# Patient Record
Sex: FEMALE | Race: WHITE | NOT HISPANIC OR LATINO | Employment: OTHER | URBAN - METROPOLITAN AREA
[De-identification: names, ages, dates, MRNs, and addresses within clinical notes are randomized per-mention and may not be internally consistent; named-entity substitution may affect disease eponyms.]

---

## 2019-01-10 ENCOUNTER — APPOINTMENT (OUTPATIENT)
Dept: WOMENS IMAGING | Facility: HOSPITAL | Age: 74
End: 2019-01-10

## 2019-01-10 PROCEDURE — 77067 SCR MAMMO BI INCL CAD: CPT | Performed by: RADIOLOGY

## 2020-06-23 ENCOUNTER — APPOINTMENT (OUTPATIENT)
Dept: WOMENS IMAGING | Facility: HOSPITAL | Age: 75
End: 2020-06-23

## 2020-06-23 PROCEDURE — 77067 SCR MAMMO BI INCL CAD: CPT | Performed by: RADIOLOGY

## 2020-06-26 ENCOUNTER — OFFICE VISIT (OUTPATIENT)
Dept: NEUROLOGY | Facility: CLINIC | Age: 75
End: 2020-06-26

## 2020-06-26 ENCOUNTER — LAB REQUISITION (OUTPATIENT)
Dept: LAB | Facility: HOSPITAL | Age: 75
End: 2020-06-26

## 2020-06-26 VITALS
HEART RATE: 72 BPM | SYSTOLIC BLOOD PRESSURE: 122 MMHG | DIASTOLIC BLOOD PRESSURE: 64 MMHG | WEIGHT: 144 LBS | BODY MASS INDEX: 24.59 KG/M2 | HEIGHT: 64 IN

## 2020-06-26 DIAGNOSIS — Z00.00 ROUTINE GENERAL MEDICAL EXAMINATION AT A HEALTH CARE FACILITY: ICD-10-CM

## 2020-06-26 DIAGNOSIS — G62.9 POLYNEUROPATHY: Primary | ICD-10-CM

## 2020-06-26 DIAGNOSIS — M79.7 FIBROMYALGIA: ICD-10-CM

## 2020-06-26 PROCEDURE — 99204 OFFICE O/P NEW MOD 45 MIN: CPT | Performed by: PSYCHIATRY & NEUROLOGY

## 2020-06-26 PROCEDURE — 36415 COLL VENOUS BLD VENIPUNCTURE: CPT

## 2020-06-26 RX ORDER — CHLORAL HYDRATE 500 MG
CAPSULE ORAL
COMMUNITY
End: 2023-03-24

## 2020-06-26 RX ORDER — ACETAMINOPHEN 500 MG
400 TABLET ORAL EVERY 6 HOURS PRN
COMMUNITY

## 2020-06-26 RX ORDER — OMEPRAZOLE 40 MG/1
40 CAPSULE, DELAYED RELEASE ORAL DAILY
COMMUNITY
End: 2023-01-07

## 2020-06-26 RX ORDER — GUAIFENESIN 600 MG/1
600 TABLET, EXTENDED RELEASE ORAL DAILY PRN
COMMUNITY
End: 2023-03-01

## 2020-06-26 NOTE — PROGRESS NOTES
Subjective:    CC: Millie De La Fuente is seen today in consultation at the request of Remi Morris MD for Polyneuropathy       HPI:  74-year-old female with a history of GERD, fibromyalgia, hyperlipidemia presents with diffuse pain in her hands and feet.  As per patient she started having burning pain in the bottoms of her feet as well as numbness about 20 years ago but over time her symptoms have progressed into her legs as well as her hands.  She states her arms are very sensitive to touch and wearing something tight can hurt a lot.  She also has diffuse pain in her joints including her shoulders and hips.  Was diagnosed with fibromyalgia several years ago but was never started on any medications as she was afraid of the side effects.  The diffuse pain is now affecting the quality of her life as she does not take part in several activities such as swimming anymore.  She denies having any history of diabetes, heavy alcohol intake or family history of neuropathy.  She grew up in [place] and took part in a lot of winter activities which she feels may have brought on the neuropathy.  Has had brain scans in the past that ruled out multiple sclerosis.    The following portions of the patient's history were reviewed today and updated as of 06/26/2020  : allergies, current medications, past family history, past medical history, past social history, past surgical history and problem list  These document will be scanned to patient's chart.      Current Outpatient Medications:   •  acetaminophen (TYLENOL) 500 MG tablet, Take 500 mg by mouth Every 6 (Six) Hours As Needed for Mild Pain ., Disp: , Rfl:   •  Cholecalciferol (VITAMIN D3) 125 MCG (5000 UT) capsule capsule, Take 5,000 Units by mouth Daily., Disp: , Rfl:   •  guaiFENesin (MUCINEX) 600 MG 12 hr tablet, Take 600 mg by mouth 2 (Two) Times a Day., Disp: , Rfl:   •  Multiple Vitamins-Minerals (HAIR SKIN & NAILS ADVANCED PO), Take  by mouth., Disp: , Rfl:   •  Omega-3 Fatty  "Acids (FISH OIL) 1000 MG capsule capsule, Take  by mouth Daily With Breakfast., Disp: , Rfl:   •  omeprazole (priLOSEC) 40 MG capsule, Take 40 mg by mouth Daily., Disp: , Rfl:   •  Probiotic Product (UP4 PROBIOTICS ADULT PO), Take  by mouth., Disp: , Rfl:    History reviewed. No pertinent past medical history.   History reviewed. No pertinent surgical history.   History reviewed. No pertinent family history.   Social History     Socioeconomic History   • Marital status: Unknown     Spouse name: Not on file   • Number of children: Not on file   • Years of education: Not on file   • Highest education level: Not on file   Tobacco Use   • Smoking status: Former Smoker   • Smokeless tobacco: Never Used   Substance and Sexual Activity   • Alcohol use: Defer   • Drug use: Defer   • Sexual activity: Defer     Review of Systems   Musculoskeletal: Positive for arthralgias.   Neurological: Positive for numbness.   All other systems reviewed and are negative.      Objective:    /64   Pulse 72   Ht 162.6 cm (64\")   Wt 65.3 kg (144 lb)   BMI 24.72 kg/m²     Neurology Exam:    General apperance: NAD.     Mental status: Alert, awake and oriented to time place and person.    Recent and Remote memory: Intact.    Attention span and Concentration: Normal.     Language and Speech: Intact- No dysarthria.    Fluency, Naming , Repitition and Comprehension:  Intact    Cranial Nerves:   CN II: Visual fields are full. Intact. Fundi - Normal, No papillederma, Pupils - PAZ  CN III, IV and VI: Extraocular movements are intact. Normal saccades.   CN V: Facial sensation is intact.   CN VII: Muscles of facial expression reveal no asymmetry. Intact.   CN VIII: Hearing is intact. Whispered voice intact.   CN IX and X: Palate elevates symmetrically. Intact  CN XI: Shoulder shrug is intact.   CN XII: Tongue is midline without evidence of atrophy or fasciculation.     Ophthalmoscopic exam of optic disc-normal    Motor:  Right UE muscle " strength 5/5. Normal tone.     Left UE muscle strength 5/5. Normal tone.      Right LE muscle strength5/5. Normal tone.     Left LE muscle strength 5/5. Normal tone.      Sensory: Mildly reduced to pinprick in both feet to level of ankles as well as slightly reduced vibration.  She had hyperesthesia to light touch in upper extremities    DTRs: 2+ bilaterally in upper and lower extremities.    Babinski: Negative bilaterally.    Co-ordination: Normal finger-to-nose, heel to shin B/L.    Rhomberg: Negative.    Gait: Normal.    Cardiovascular: Regular rate and rhythm without murmur, gallop or rub.    Assessment and Plan:  1. Polyneuropathy  Patient could have a combination of idiopathic peripheral neuropathy in addition to fibromyalgia  I will get an EMG/NCS as well as obtain a neuropathy panel  I may start her on Cymbalta at her next visit that may help with the neuropathic pain and her fibromyalgia    - ALAN by IFA, Reflex 9-biomarkers profile; Future  - Hemoglobin A1c; Future  - Protein Elec + Interp, Serum; Future  - TSH+Free T4; Future  - Vitamin B12 & Folate; Future  - Vitamin B6; Future  - EMG & Nerve Conduction Test; Future    2. Fibromyalgia  May start her on Cymbalta at her next visit       Return in about 2 months (around 8/26/2020).         Madonna Irene MD

## 2020-06-29 ENCOUNTER — TELEPHONE (OUTPATIENT)
Dept: NEUROLOGY | Facility: CLINIC | Age: 75
End: 2020-06-29

## 2020-07-01 LAB
ALBUMIN SERPL ELPH-MCNC: 3.8 G/DL (ref 2.9–4.4)
ALBUMIN/GLOB SERPL: 1.2 {RATIO} (ref 0.7–1.7)
ALPHA1 GLOB SERPL ELPH-MCNC: 0.3 G/DL (ref 0–0.4)
ALPHA2 GLOB SERPL ELPH-MCNC: 0.8 G/DL (ref 0.4–1)
ANA TITR SER IF: NEGATIVE {TITER}
B-GLOBULIN SERPL ELPH-MCNC: 1.2 G/DL (ref 0.7–1.3)
FOLATE SERPL-MCNC: 18.5 NG/ML
GAMMA GLOB SERPL ELPH-MCNC: 0.9 G/DL (ref 0.4–1.8)
GLOBULIN SER CALC-MCNC: 3.2 G/DL (ref 2.2–3.9)
HBA1C MFR BLD: 5.7 % (ref 4.8–5.6)
LABORATORY COMMENT REPORT: NORMAL
LABORATORY COMMENT REPORT: NORMAL
M PROTEIN SERPL ELPH-MCNC: NORMAL G/DL
PROT PATTERN SERPL ELPH-IMP: NORMAL
PROT SERPL-MCNC: 7 G/DL (ref 6–8.5)
T4 FREE SERPL-MCNC: 1.18 NG/DL (ref 0.82–1.77)
TSH SERPL DL<=0.005 MIU/L-ACNC: 1.87 UIU/ML (ref 0.45–4.5)
VIT B12 SERPL-MCNC: 504 PG/ML (ref 232–1245)
VIT B6 SERPL-MCNC: 25.7 UG/L (ref 2–32.8)

## 2020-07-01 NOTE — TELEPHONE ENCOUNTER
Called and spoke to pt informing of lab results and informed per Dr. Irene's recommendation concerning modifying diet. Pt stated verbal understanding. Thanks.

## 2020-08-06 ENCOUNTER — HOSPITAL ENCOUNTER (OUTPATIENT)
Dept: NEUROLOGY | Facility: HOSPITAL | Age: 75
Discharge: HOME | End: 2020-08-06
Admitting: PSYCHIATRY & NEUROLOGY

## 2020-08-06 DIAGNOSIS — G62.9 POLYNEUROPATHY: ICD-10-CM

## 2020-08-06 PROCEDURE — 95913 NRV CNDJ TEST 13/> STUDIES: CPT

## 2020-08-06 PROCEDURE — 95886 MUSC TEST DONE W/N TEST COMP: CPT

## 2020-08-06 PROCEDURE — 95912 NRV CNDJ TEST 11-12 STUDIES: CPT

## 2020-08-13 ENCOUNTER — TELEPHONE (OUTPATIENT)
Dept: NEUROLOGY | Facility: CLINIC | Age: 75
End: 2020-08-13

## 2020-08-13 NOTE — TELEPHONE ENCOUNTER
Left detailed vm for Millie relaying results/recomendation per  and requested a return call to clinic regarding her response. Office # given. Thanks!

## 2020-09-17 ENCOUNTER — OFFICE VISIT (OUTPATIENT)
Dept: NEUROLOGY | Facility: CLINIC | Age: 75
End: 2020-09-17

## 2020-09-17 VITALS
DIASTOLIC BLOOD PRESSURE: 64 MMHG | HEIGHT: 64 IN | HEART RATE: 77 BPM | SYSTOLIC BLOOD PRESSURE: 104 MMHG | OXYGEN SATURATION: 99 % | WEIGHT: 140.2 LBS | TEMPERATURE: 96.9 F | BODY MASS INDEX: 23.93 KG/M2

## 2020-09-17 DIAGNOSIS — G62.9 POLYNEUROPATHY: Primary | ICD-10-CM

## 2020-09-17 PROCEDURE — 99214 OFFICE O/P EST MOD 30 MIN: CPT | Performed by: PSYCHIATRY & NEUROLOGY

## 2020-09-17 RX ORDER — ATORVASTATIN CALCIUM 40 MG/1
40 TABLET, FILM COATED ORAL DAILY
COMMUNITY
Start: 2020-06-30 | End: 2022-12-16

## 2020-09-17 RX ORDER — GABAPENTIN 100 MG/1
300 CAPSULE ORAL NIGHTLY
Qty: 90 CAPSULE | Refills: 3 | Status: SHIPPED | OUTPATIENT
Start: 2020-09-17 | End: 2020-11-18 | Stop reason: SDUPTHER

## 2020-09-17 NOTE — PROGRESS NOTES
Subjective:    CC: Millie De La Fuente is seen today  for Peripheral Neuropathy       HPI:  Current visit-since her last visit patient had her EMG/NCS that showed mild peripheral neuropathy.  She also had blood work which was normal other than slightly high A1c of 5.7.  Today she tells me that she tried low doses of Cymbalta for years ago for fibromyalgia but had side effects of dizziness however was coming off of her antibiotics for an infection at the time so does not know if the side effects were caused by the antibiotics or the Cymbalta.  She continues to have burning in her feet as well as generalized myalgias.    Initial visit- 74-year-old female with a history of GERD, fibromyalgia, hyperlipidemia presents with diffuse pain in her hands and feet.  As per patient she started having burning pain in the bottoms of her feet as well as numbness about 20 years ago but over time her symptoms have progressed into her legs as well as her hands.  She states her arms are very sensitive to touch and wearing something tight can hurt a lot.  She also has diffuse pain in her joints including her shoulders and hips.  Was diagnosed with fibromyalgia several years ago but was never started on any medications as she was afraid of the side effects.  The diffuse pain is now affecting the quality of her life as she does not take part in several activities such as swimming anymore.  She denies having any history of diabetes, heavy alcohol intake or family history of neuropathy.  She grew up in [place] and took part in a lot of winter activities which she feels may have brought on the neuropathy.  Has had brain scans in the past that ruled out multiple sclerosis.    The following portions of the patient's history were reviewed today and updated as of 06/26/2020  : allergies, current medications, past family history, past medical history, past social history, past surgical history and problem list  These document will be scanned to patient's  "chart.      Current Outpatient Medications:   •  acetaminophen (TYLENOL) 500 MG tablet, Take 500 mg by mouth Every 6 (Six) Hours As Needed for Mild Pain ., Disp: , Rfl:   •  atorvastatin (LIPITOR) 40 MG tablet, Take 40 mg by mouth Daily., Disp: , Rfl:   •  Cholecalciferol (VITAMIN D3) 125 MCG (5000 UT) capsule capsule, Take 5,000 Units by mouth Daily., Disp: , Rfl:   •  guaiFENesin (MUCINEX) 600 MG 12 hr tablet, Take 600 mg by mouth Daily As Needed., Disp: , Rfl:   •  Multiple Vitamins-Minerals (HAIR SKIN & NAILS ADVANCED PO), Take  by mouth., Disp: , Rfl:   •  Omega-3 Fatty Acids (FISH OIL) 1000 MG capsule capsule, Take  by mouth Daily With Breakfast., Disp: , Rfl:   •  omeprazole (priLOSEC) 40 MG capsule, Take 40 mg by mouth Daily., Disp: , Rfl:   •  Probiotic Product (UP4 PROBIOTICS ADULT PO), Take  by mouth., Disp: , Rfl:   •  gabapentin (Neurontin) 100 MG capsule, Take 3 capsules by mouth Every Night., Disp: 90 capsule, Rfl: 3   No past medical history on file.   No past surgical history on file.   No family history on file.   Social History     Socioeconomic History   • Marital status: Unknown     Spouse name: Not on file   • Number of children: Not on file   • Years of education: Not on file   • Highest education level: Not on file   Tobacco Use   • Smoking status: Former Smoker   • Smokeless tobacco: Never Used   Substance and Sexual Activity   • Alcohol use: Defer   • Drug use: Defer   • Sexual activity: Defer     Review of Systems   Musculoskeletal: Positive for arthralgias.   Neurological: Positive for numbness.   All other systems reviewed and are negative.      Objective:    /64   Pulse 77   Temp 96.9 °F (36.1 °C) (Temporal)   Ht 162.6 cm (64.02\")   Wt 63.6 kg (140 lb 3.2 oz)   SpO2 99%   BMI 24.05 kg/m²     Neurology Exam:    General apperance: NAD.     Mental status: Alert, awake and oriented to time place and person.    Recent and Remote memory: Intact.    Attention span and " Concentration: Normal.     Language and Speech: Intact- No dysarthria.    Fluency, Naming , Repitition and Comprehension:  Intact    Cranial Nerves:   CN II: Visual fields are full. Intact. Fundi - Normal, No papillederma, Pupils - PAZ  CN III, IV and VI: Extraocular movements are intact. Normal saccades.   CN V: Facial sensation is intact.   CN VII: Muscles of facial expression reveal no asymmetry. Intact.   CN VIII: Hearing is intact. Whispered voice intact.   CN IX and X: Palate elevates symmetrically. Intact  CN XI: Shoulder shrug is intact.   CN XII: Tongue is midline without evidence of atrophy or fasciculation.     Ophthalmoscopic exam of optic disc-normal    Motor:  Right UE muscle strength 5/5. Normal tone.     Left UE muscle strength 5/5. Normal tone.      Right LE muscle strength5/5. Normal tone.     Left LE muscle strength 5/5. Normal tone.      Sensory: Mildly reduced to pinprick in both feet to level of ankles as well as slightly reduced vibration.  She had hyperesthesia to light touch in upper extremities    DTRs: 2+ bilaterally in upper and lower extremities.    Babinski: Negative bilaterally.    Co-ordination: Normal finger-to-nose, heel to shin B/L.    Rhomberg: Negative.    Gait: Normal.    Cardiovascular: Regular rate and rhythm without murmur, gallop or rub.    Assessment and Plan:  1. Polyneuropathy  She most likely has idiopathic neuropathy.  For her prediabetes I have asked her to carry on dietary modifications  I will start her on low doses of gabapentin 100 mg gradually increasing to 3 capsules at night.  In the future I may further increase her dose    2. Fibromyalgia  Hopefully the gabapentin will help       Return in about 2 months (around 11/17/2020).         Madonna Irene MD

## 2020-11-18 ENCOUNTER — OFFICE VISIT (OUTPATIENT)
Dept: NEUROLOGY | Facility: CLINIC | Age: 75
End: 2020-11-18

## 2020-11-18 VITALS
OXYGEN SATURATION: 98 % | TEMPERATURE: 97.3 F | HEART RATE: 72 BPM | DIASTOLIC BLOOD PRESSURE: 80 MMHG | SYSTOLIC BLOOD PRESSURE: 98 MMHG

## 2020-11-18 DIAGNOSIS — G62.9 POLYNEUROPATHY: Primary | ICD-10-CM

## 2020-11-18 PROCEDURE — 99213 OFFICE O/P EST LOW 20 MIN: CPT | Performed by: PSYCHIATRY & NEUROLOGY

## 2020-11-18 RX ORDER — GABAPENTIN 100 MG/1
CAPSULE ORAL
Qty: 90 CAPSULE | Refills: 3 | Status: SHIPPED | OUTPATIENT
Start: 2020-11-18 | End: 2021-03-11 | Stop reason: SDUPTHER

## 2020-11-18 NOTE — PROGRESS NOTES
Subjective:    CC: Millie De La Fuente is seen today  for Peripheral Neuropathy       HPI:  Current visit-patient states that she feels a lot better after starting gabapentin now at 300 mg nightly as her feet have stopped hurting.  However she has felt slightly off balance and lightheaded on standing up and walking occasionally.  In fact she sustained a fall last week hurting her left foot.  Has not checked her blood pressure to make sure that it is running within normal limits as her blood pressure machine is broken.    Last visit-since her last visit patient had her EMG/NCS that showed mild peripheral neuropathy.  She also had blood work which was normal other than slightly high A1c of 5.7.  Today she tells me that she tried low doses of Cymbalta for years ago for fibromyalgia but had side effects of dizziness however was coming off of her antibiotics for an infection at the time so does not know if the side effects were caused by the antibiotics or the Cymbalta.  She continues to have burning in her feet as well as generalized myalgias.    Initial visit- 74-year-old female with a history of GERD, fibromyalgia, hyperlipidemia presents with diffuse pain in her hands and feet.  As per patient she started having burning pain in the bottoms of her feet as well as numbness about 20 years ago but over time her symptoms have progressed into her legs as well as her hands.  She states her arms are very sensitive to touch and wearing something tight can hurt a lot.  She also has diffuse pain in her joints including her shoulders and hips.  Was diagnosed with fibromyalgia several years ago but was never started on any medications as she was afraid of the side effects.  The diffuse pain is now affecting the quality of her life as she does not take part in several activities such as swimming anymore.  She denies having any history of diabetes, heavy alcohol intake or family history of neuropathy.  She grew up in [place] and took part  in a lot of winter activities which she feels may have brought on the neuropathy.  Has had brain scans in the past that ruled out multiple sclerosis.    The following portions of the patient's history were reviewed today and updated as of 06/26/2020  : allergies, current medications, past family history, past medical history, past social history, past surgical history and problem list  These document will be scanned to patient's chart.      Current Outpatient Medications:   •  acetaminophen (TYLENOL) 500 MG tablet, Take 400 mg by mouth Every 6 (Six) Hours As Needed for Mild Pain ., Disp: , Rfl:   •  atorvastatin (LIPITOR) 40 MG tablet, Take 40 mg by mouth Daily., Disp: , Rfl:   •  Cholecalciferol (VITAMIN D3) 125 MCG (5000 UT) capsule capsule, Take 5,000 Units by mouth Daily., Disp: , Rfl:   •  gabapentin (Neurontin) 100 MG capsule, Take 2 to 3 tablets as needed at night, Disp: 90 capsule, Rfl: 3  •  Multiple Vitamins-Minerals (HAIR SKIN & NAILS ADVANCED PO), Take  by mouth., Disp: , Rfl:   •  Omega-3 Fatty Acids (FISH OIL) 1000 MG capsule capsule, Take  by mouth Daily With Breakfast., Disp: , Rfl:   •  omeprazole (priLOSEC) 40 MG capsule, Take 40 mg by mouth Daily., Disp: , Rfl:   •  Probiotic Product (UP4 PROBIOTICS ADULT PO), Take  by mouth., Disp: , Rfl:   •  guaiFENesin (MUCINEX) 600 MG 12 hr tablet, Take 600 mg by mouth Daily As Needed., Disp: , Rfl:    History reviewed. No pertinent past medical history.   History reviewed. No pertinent surgical history.   History reviewed. No pertinent family history.   Social History     Socioeconomic History   • Marital status: Unknown     Spouse name: Not on file   • Number of children: Not on file   • Years of education: Not on file   • Highest education level: Not on file   Tobacco Use   • Smoking status: Former Smoker   • Smokeless tobacco: Never Used   Substance and Sexual Activity   • Alcohol use: Defer   • Drug use: Defer   • Sexual activity: Defer     Review of  Systems   Musculoskeletal: Positive for arthralgias.   Neurological: Positive for numbness.   All other systems reviewed and are negative.      Objective:    BP 98/80   Pulse 72   Temp 97.3 °F (36.3 °C)   SpO2 98%     Neurology Exam:    General apperance: NAD.     Mental status: Alert, awake and oriented to time place and person.    Recent and Remote memory: Intact.    Attention span and Concentration: Normal.     Language and Speech: Intact- No dysarthria.    Fluency, Naming , Repitition and Comprehension:  Intact    Cranial Nerves:   CN II: Visual fields are full. Intact. Fundi - Normal, No papillederma, Pupils - PAZ  CN III, IV and VI: Extraocular movements are intact. Normal saccades.   CN V: Facial sensation is intact.   CN VII: Muscles of facial expression reveal no asymmetry. Intact.   CN VIII: Hearing is intact. Whispered voice intact.   CN IX and X: Palate elevates symmetrically. Intact  CN XI: Shoulder shrug is intact.   CN XII: Tongue is midline without evidence of atrophy or fasciculation.     Ophthalmoscopic exam of optic disc-normal    Motor:  Right UE muscle strength 5/5. Normal tone.     Left UE muscle strength 5/5. Normal tone.      Right LE muscle strength5/5. Normal tone.     Left LE muscle strength 5/5. Normal tone.      Sensory: Mildly reduced to pinprick in both feet to level of ankles as well as slightly reduced vibration.  She had hyperesthesia to light touch in upper extremities    DTRs: 2+ bilaterally in upper and lower extremities.    Babinski: Negative bilaterally.    Co-ordination: Normal finger-to-nose, heel to shin B/L.    Rhomberg: Negative.    Gait: Normal.    Cardiovascular: Regular rate and rhythm without murmur, gallop or rub.    Assessment and Plan:  1. Polyneuropathy  She most likely has idiopathic neuropathy.  For her prediabetes I had asked her to carry on dietary modifications  I have told her to reduce her gabapentin to 200 mg nightly.  She can take an additional dose  of 100 mg if needed  I do not think her balance problems are because of gabapentin.  I feel it is most likely due to her low blood pressure and possibly drops in blood pressure on standing up.  I have told her to monitor her blood pressure regularly, keep herself well-hydrated and wear moderate compression stockings if her lightheadedness does not resolve    2. Fibromyalgia  Continue gabapentin       Return in about 5 months (around 4/18/2021).         Madonna Irene MD

## 2021-02-22 ENCOUNTER — APPOINTMENT (OUTPATIENT)
Dept: CT IMAGING | Facility: HOSPITAL | Age: 76
End: 2021-02-22

## 2021-02-22 ENCOUNTER — HOSPITAL ENCOUNTER (EMERGENCY)
Facility: HOSPITAL | Age: 76
Discharge: HOME | End: 2021-02-22
Attending: EMERGENCY MEDICINE | Admitting: EMERGENCY MEDICINE

## 2021-02-22 VITALS
BODY MASS INDEX: 23.9 KG/M2 | RESPIRATION RATE: 18 BRPM | WEIGHT: 140 LBS | SYSTOLIC BLOOD PRESSURE: 112 MMHG | HEIGHT: 64 IN | DIASTOLIC BLOOD PRESSURE: 53 MMHG | OXYGEN SATURATION: 96 % | HEART RATE: 68 BPM | TEMPERATURE: 98 F

## 2021-02-22 DIAGNOSIS — E87.1 HYPONATREMIA: ICD-10-CM

## 2021-02-22 DIAGNOSIS — U07.1 COVID-19 VIRUS INFECTION: Primary | ICD-10-CM

## 2021-02-22 LAB
ALBUMIN SERPL-MCNC: 3.8 G/DL (ref 3.5–5.2)
ALBUMIN/GLOB SERPL: 1.4 G/DL
ALP SERPL-CCNC: 81 U/L (ref 39–117)
ALT SERPL W P-5'-P-CCNC: 14 U/L (ref 1–33)
ANION GAP SERPL CALCULATED.3IONS-SCNC: 6 MMOL/L (ref 5–15)
AST SERPL-CCNC: 21 U/L (ref 1–32)
B PARAPERT DNA SPEC QL NAA+PROBE: NOT DETECTED
B PERT DNA SPEC QL NAA+PROBE: NOT DETECTED
BASOPHILS # BLD AUTO: 0.01 10*3/MM3 (ref 0–0.2)
BASOPHILS NFR BLD AUTO: 0.2 % (ref 0–1.5)
BILIRUB SERPL-MCNC: 0.3 MG/DL (ref 0–1.2)
BILIRUB UR QL STRIP: NEGATIVE
BUN SERPL-MCNC: 10 MG/DL (ref 8–23)
BUN/CREAT SERPL: 15.9 (ref 7–25)
C PNEUM DNA NPH QL NAA+NON-PROBE: NOT DETECTED
CALCIUM SPEC-SCNC: 8.6 MG/DL (ref 8.6–10.5)
CHLORIDE SERPL-SCNC: 93 MMOL/L (ref 98–107)
CLARITY UR: CLEAR
CO2 SERPL-SCNC: 27 MMOL/L (ref 22–29)
COLOR UR: YELLOW
CREAT SERPL-MCNC: 0.63 MG/DL (ref 0.57–1)
D-LACTATE SERPL-SCNC: 0.7 MMOL/L (ref 0.5–2)
DEPRECATED RDW RBC AUTO: 41.3 FL (ref 37–54)
EOSINOPHIL # BLD AUTO: 0.03 10*3/MM3 (ref 0–0.4)
EOSINOPHIL NFR BLD AUTO: 0.6 % (ref 0.3–6.2)
ERYTHROCYTE [DISTWIDTH] IN BLOOD BY AUTOMATED COUNT: 12.3 % (ref 12.3–15.4)
FLUAV SUBTYP SPEC NAA+PROBE: NOT DETECTED
FLUBV RNA ISLT QL NAA+PROBE: NOT DETECTED
GFR SERPL CREATININE-BSD FRML MDRD: 92 ML/MIN/1.73
GLOBULIN UR ELPH-MCNC: 2.7 GM/DL
GLUCOSE SERPL-MCNC: 104 MG/DL (ref 65–99)
GLUCOSE UR STRIP-MCNC: NEGATIVE MG/DL
HADV DNA SPEC NAA+PROBE: NOT DETECTED
HCOV 229E RNA SPEC QL NAA+PROBE: NOT DETECTED
HCOV HKU1 RNA SPEC QL NAA+PROBE: NOT DETECTED
HCOV NL63 RNA SPEC QL NAA+PROBE: NOT DETECTED
HCOV OC43 RNA SPEC QL NAA+PROBE: NOT DETECTED
HCT VFR BLD AUTO: 34.6 % (ref 34–46.6)
HGB BLD-MCNC: 11.7 G/DL (ref 12–15.9)
HGB UR QL STRIP.AUTO: NEGATIVE
HMPV RNA NPH QL NAA+NON-PROBE: NOT DETECTED
HOLD SPECIMEN: NORMAL
HPIV1 RNA SPEC QL NAA+PROBE: NOT DETECTED
HPIV2 RNA SPEC QL NAA+PROBE: NOT DETECTED
HPIV3 RNA NPH QL NAA+PROBE: NOT DETECTED
HPIV4 P GENE NPH QL NAA+PROBE: NOT DETECTED
IMM GRANULOCYTES # BLD AUTO: 0.03 10*3/MM3 (ref 0–0.05)
IMM GRANULOCYTES NFR BLD AUTO: 0.6 % (ref 0–0.5)
KETONES UR QL STRIP: NEGATIVE
LEUKOCYTE ESTERASE UR QL STRIP.AUTO: NEGATIVE
LIPASE SERPL-CCNC: 31 U/L (ref 13–60)
LYMPHOCYTES # BLD AUTO: 0.87 10*3/MM3 (ref 0.7–3.1)
LYMPHOCYTES NFR BLD AUTO: 16.3 % (ref 19.6–45.3)
M PNEUMO IGG SER IA-ACNC: NOT DETECTED
MCH RBC QN AUTO: 30.9 PG (ref 26.6–33)
MCHC RBC AUTO-ENTMCNC: 33.8 G/DL (ref 31.5–35.7)
MCV RBC AUTO: 91.3 FL (ref 79–97)
MONOCYTES # BLD AUTO: 0.72 10*3/MM3 (ref 0.1–0.9)
MONOCYTES NFR BLD AUTO: 13.5 % (ref 5–12)
NEUTROPHILS NFR BLD AUTO: 3.69 10*3/MM3 (ref 1.7–7)
NEUTROPHILS NFR BLD AUTO: 68.8 % (ref 42.7–76)
NITRITE UR QL STRIP: NEGATIVE
NRBC BLD AUTO-RTO: 0 /100 WBC (ref 0–0.2)
PH UR STRIP.AUTO: 6.5 [PH] (ref 5–8)
PLATELET # BLD AUTO: 207 10*3/MM3 (ref 140–450)
PMV BLD AUTO: 9.8 FL (ref 6–12)
POTASSIUM SERPL-SCNC: 4.7 MMOL/L (ref 3.5–5.2)
PROT SERPL-MCNC: 6.5 G/DL (ref 6–8.5)
PROT UR QL STRIP: NEGATIVE
RBC # BLD AUTO: 3.79 10*6/MM3 (ref 3.77–5.28)
RHINOVIRUS RNA SPEC NAA+PROBE: NOT DETECTED
RSV RNA NPH QL NAA+NON-PROBE: NOT DETECTED
SARS-COV-2 RNA NPH QL NAA+NON-PROBE: DETECTED
SODIUM SERPL-SCNC: 126 MMOL/L (ref 136–145)
SP GR UR STRIP: 1.02 (ref 1–1.03)
UROBILINOGEN UR QL STRIP: NORMAL
WBC # BLD AUTO: 5.35 10*3/MM3 (ref 3.4–10.8)
WHOLE BLOOD HOLD SPECIMEN: NORMAL
WHOLE BLOOD HOLD SPECIMEN: NORMAL

## 2021-02-22 PROCEDURE — 96360 HYDRATION IV INFUSION INIT: CPT

## 2021-02-22 PROCEDURE — 0202U NFCT DS 22 TRGT SARS-COV-2: CPT | Performed by: PHYSICIAN ASSISTANT

## 2021-02-22 PROCEDURE — 0 IOPAMIDOL PER 1 ML: Performed by: EMERGENCY MEDICINE

## 2021-02-22 PROCEDURE — 83690 ASSAY OF LIPASE: CPT | Performed by: EMERGENCY MEDICINE

## 2021-02-22 PROCEDURE — 85025 COMPLETE CBC W/AUTO DIFF WBC: CPT

## 2021-02-22 PROCEDURE — 71275 CT ANGIOGRAPHY CHEST: CPT

## 2021-02-22 PROCEDURE — 81003 URINALYSIS AUTO W/O SCOPE: CPT | Performed by: EMERGENCY MEDICINE

## 2021-02-22 PROCEDURE — 80053 COMPREHEN METABOLIC PANEL: CPT | Performed by: EMERGENCY MEDICINE

## 2021-02-22 PROCEDURE — 99284 EMERGENCY DEPT VISIT MOD MDM: CPT

## 2021-02-22 PROCEDURE — 83605 ASSAY OF LACTIC ACID: CPT | Performed by: EMERGENCY MEDICINE

## 2021-02-22 PROCEDURE — 63710000001 DEXAMETHASONE PER 0.25 MG: Performed by: PHYSICIAN ASSISTANT

## 2021-02-22 RX ORDER — AZITHROMYCIN 250 MG/1
TABLET, FILM COATED ORAL
Qty: 6 TABLET | Refills: 0 | Status: SHIPPED | OUTPATIENT
Start: 2021-02-22 | End: 2021-09-27

## 2021-02-22 RX ORDER — SODIUM CHLORIDE 9 MG/ML
10 INJECTION INTRAVENOUS AS NEEDED
Status: DISCONTINUED | OUTPATIENT
Start: 2021-02-22 | End: 2021-02-22 | Stop reason: HOSPADM

## 2021-02-22 RX ORDER — ONDANSETRON 4 MG/1
4 TABLET, FILM COATED ORAL EVERY 8 HOURS PRN
Qty: 20 TABLET | Refills: 0 | Status: SHIPPED | OUTPATIENT
Start: 2021-02-22 | End: 2022-03-24

## 2021-02-22 RX ADMIN — DEXAMETHASONE 6 MG: 4 TABLET ORAL at 14:57

## 2021-02-22 RX ADMIN — IOPAMIDOL 80 ML: 755 INJECTION, SOLUTION INTRAVENOUS at 13:58

## 2021-02-22 RX ADMIN — SODIUM CHLORIDE 1000 ML: 9 INJECTION, SOLUTION INTRAVENOUS at 13:05

## 2021-02-22 NOTE — DISCHARGE INSTRUCTIONS
Monitor your oxygen levels at home with the pulse oximeter given to you today.  If your oxygen levels drop below 94% on room air at rest, return to the emergency department.  Recheck with your primary care provider tomorrow by telephone.  Return to the emergency department immediately if any change or worsening of symptoms

## 2021-02-22 NOTE — ED PROVIDER NOTES
EMERGENCY DEPARTMENT ENCOUNTER    Pt Name: Millie De La Fuente  MRN: [id number]  Pt :   1945  Room Number:  1515  Date of encounter:  2021  PCP: Remi Morris MD  ED Provider: Richard Cherry PA-C    Historian: Patient      HPI:  Chief Complaint: Covid exposure, worsening illness        Context: Millie De La Fuente is a 75 y.o. female who presents to the ED c/o onset of Covid-like symptoms on February 10.  Her  tested positive on 2/10, patient tested negative, however she was not feeling well with some mild symptoms initially with nausea decreased appetite and some body aches.  Yesterday her symptoms worsened, with chills sweats dry cough slight shortness of breath dyspnea on exertion profound muscle fatigue, throbbing headache, diffuse myalgias and arthralgias with decreased appetite.  She did have one episode of diarrhea this morning, and has had decreased p.o. intake.  Urine output has been slightly decreased.  No stiff neck vision changes or photophobia.  No vision changes or photophobia.  Past medical history otherwise remarkable for fibromyalgia.  Primary care provider is Dr. Morris.  Medications reviewed, she is allergic to clindamycin lincomycin sulfa and penicillins.      PAST MEDICAL HISTORY  Active Ambulatory Problems     Diagnosis Date Noted   • Fibromyalgia 2020     Resolved Ambulatory Problems     Diagnosis Date Noted   • No Resolved Ambulatory Problems     Past Medical History:   Diagnosis Date   • GERD (gastroesophageal reflux disease)    • Hyperlipidemia          PAST SURGICAL HISTORY  Past Surgical History:   Procedure Laterality Date   • HYSTERECTOMY     • SHOULDER SURGERY     • TONSILLECTOMY           FAMILY HISTORY  History reviewed. No pertinent family history.      SOCIAL HISTORY  Social History     Socioeconomic History   • Marital status:      Spouse name: Not on file   • Number of children: Not on file   • Years of education: Not on file   • Highest education level: Not  on file   Tobacco Use   • Smoking status: Former Smoker     Quit date:      Years since quittin.1   • Smokeless tobacco: Never Used   Substance and Sexual Activity   • Alcohol use: Never     Frequency: Never   • Drug use: Never   • Sexual activity: Defer         ALLERGIES  Clindamycin/lincomycin, Sulfa antibiotics, and Penicillins        REVIEW OF SYSTEMS  Review of Systems   Constitutional: Positive for activity change, appetite change, chills, diaphoresis, fatigue and fever.   HENT: Negative for congestion, rhinorrhea and sore throat.    Eyes: Negative for photophobia and visual disturbance.   Respiratory: Positive for cough and shortness of breath. Negative for wheezing.    Cardiovascular: Negative for chest pain, palpitations and leg swelling.   Gastrointestinal: Positive for diarrhea and nausea. Negative for abdominal pain and vomiting.   Genitourinary: Negative for dysuria, flank pain and hematuria.   Musculoskeletal: Positive for arthralgias, back pain, myalgias and neck pain.   Neurological: Positive for dizziness and headaches. Negative for syncope.   All other systems reviewed and are negative.       All systems reviewed and negative except for those discussed in HPI.       PHYSICAL EXAM    I have reviewed the triage vital signs and nursing notes.    ED Triage Vitals [21 1153]   Temp Heart Rate Resp BP SpO2   98 °F (36.7 °C) 66 18 145/71 98 %      Temp src Heart Rate Source Patient Position BP Location FiO2 (%)   Oral Monitor Lying -- --       Physical Exam  GENERAL:   Pleasant awake alert and oriented not toxic appearing afebrile hemodynamically stable  HENT: Nares patent.  Mucous membranes are dry no facial asymmetry airway is patent uvula is midline and submental space is soft.  EYES: No scleral icterus.  CV: Regular rhythm, regular rate.  RESPIRATORY: Normal effort.  No audible wheezes, rales or rhonchi.  ABDOMEN: Soft, nontender.  No guarding or rebound tenderness.  Bowel sounds are  normal.  No palpable organomegaly or pulsatile masses.  MUSCULOSKELETAL: No deformities.   NEURO: Alert, moves all extremities, follows commands.  SKIN: Warm, dry, no rash visualized.        LAB RESULTS  Recent Results (from the past 24 hour(s))   Lactic Acid, Plasma    Collection Time: 02/22/21 12:00 PM    Specimen: Blood   Result Value Ref Range    Lactate 0.7 0.5 - 2.0 mmol/L   Light Blue Top    Collection Time: 02/22/21 12:00 PM   Result Value Ref Range    Extra Tube hold for add-on    Lavender Top    Collection Time: 02/22/21 12:00 PM   Result Value Ref Range    Extra Tube     Gold Top - SST    Collection Time: 02/22/21 12:00 PM   Result Value Ref Range    Extra Tube Hold for add-ons.    Gray Top - Ice    Collection Time: 02/22/21 12:00 PM   Result Value Ref Range    Extra Tube Hold for add-ons.    CBC Auto Differential    Collection Time: 02/22/21 12:00 PM    Specimen: Blood   Result Value Ref Range    WBC 5.35 3.40 - 10.80 10*3/mm3    RBC 3.79 3.77 - 5.28 10*6/mm3    Hemoglobin 11.7 (L) 12.0 - 15.9 g/dL    Hematocrit 34.6 34.0 - 46.6 %    MCV 91.3 79.0 - 97.0 fL    MCH 30.9 26.6 - 33.0 pg    MCHC 33.8 31.5 - 35.7 g/dL    RDW 12.3 12.3 - 15.4 %    RDW-SD 41.3 37.0 - 54.0 fl    MPV 9.8 6.0 - 12.0 fL    Platelets 207 140 - 450 10*3/mm3    Neutrophil % 68.8 42.7 - 76.0 %    Lymphocyte % 16.3 (L) 19.6 - 45.3 %    Monocyte % 13.5 (H) 5.0 - 12.0 %    Eosinophil % 0.6 0.3 - 6.2 %    Basophil % 0.2 0.0 - 1.5 %    Immature Grans % 0.6 (H) 0.0 - 0.5 %    Neutrophils, Absolute 3.69 1.70 - 7.00 10*3/mm3    Lymphocytes, Absolute 0.87 0.70 - 3.10 10*3/mm3    Monocytes, Absolute 0.72 0.10 - 0.90 10*3/mm3    Eosinophils, Absolute 0.03 0.00 - 0.40 10*3/mm3    Basophils, Absolute 0.01 0.00 - 0.20 10*3/mm3    Immature Grans, Absolute 0.03 0.00 - 0.05 10*3/mm3    nRBC 0.0 0.0 - 0.2 /100 WBC   Urinalysis With Microscopic If Indicated (No Culture) - Urine, Clean Catch    Collection Time: 02/22/21 12:14 PM    Specimen: Urine, Clean  Catch   Result Value Ref Range    Color, UA Yellow Yellow, Straw    Appearance, UA Clear Clear    pH, UA 6.5 5.0 - 8.0    Specific Gravity, UA 1.016 1.001 - 1.030    Glucose, UA Negative Negative    Ketones, UA Negative Negative    Bilirubin, UA Negative Negative    Blood, UA Negative Negative    Protein, UA Negative Negative    Leuk Esterase, UA Negative Negative    Nitrite, UA Negative Negative    Urobilinogen, UA 1.0 E.U./dL 0.2 - 1.0 E.U./dL   Comprehensive Metabolic Panel    Collection Time: 02/22/21 12:31 PM    Specimen: Blood   Result Value Ref Range    Glucose 104 (H) 65 - 99 mg/dL    BUN 10 8 - 23 mg/dL    Creatinine 0.63 0.57 - 1.00 mg/dL    Sodium 126 (L) 136 - 145 mmol/L    Potassium 4.7 3.5 - 5.2 mmol/L    Chloride 93 (L) 98 - 107 mmol/L    CO2 27.0 22.0 - 29.0 mmol/L    Calcium 8.6 8.6 - 10.5 mg/dL    Total Protein 6.5 6.0 - 8.5 g/dL    Albumin 3.80 3.50 - 5.20 g/dL    ALT (SGPT) 14 1 - 33 U/L    AST (SGOT) 21 1 - 32 U/L    Alkaline Phosphatase 81 39 - 117 U/L    Total Bilirubin 0.3 0.0 - 1.2 mg/dL    eGFR Non African Amer 92 >60 mL/min/1.73    Globulin 2.7 gm/dL    A/G Ratio 1.4 g/dL    BUN/Creatinine Ratio 15.9 7.0 - 25.0    Anion Gap 6.0 5.0 - 15.0 mmol/L   Lipase    Collection Time: 02/22/21 12:31 PM    Specimen: Blood   Result Value Ref Range    Lipase 31 13 - 60 U/L   Green Top (Gel)    Collection Time: 02/22/21 12:31 PM   Result Value Ref Range    Extra Tube Hold for add-ons.    Respiratory Panel PCR w/COVID-19(SARS-CoV-2) GINA/MARTITA/SHAUNA/PAD/COR/MAD/CRESENCIO In-House, NP Swab in UTM/VTM, 3-4 HR TAT - Swab, Nasopharynx    Collection Time: 02/22/21  1:07 PM    Specimen: Nasopharynx; Swab   Result Value Ref Range    ADENOVIRUS, PCR Not Detected Not Detected    Coronavirus 229E Not Detected Not Detected    Coronavirus HKU1 Not Detected Not Detected    Coronavirus NL63 Not Detected Not Detected    Coronavirus OC43 Not Detected Not Detected    COVID19 Detected (C) Not Detected - Ref. Range    Human  Metapneumovirus Not Detected Not Detected    Human Rhinovirus/Enterovirus Not Detected Not Detected    Influenza A PCR Not Detected Not Detected    Influenza B PCR Not Detected Not Detected    Parainfluenza Virus 1 Not Detected Not Detected    Parainfluenza Virus 2 Not Detected Not Detected    Parainfluenza Virus 3 Not Detected Not Detected    Parainfluenza Virus 4 Not Detected Not Detected    RSV, PCR Not Detected Not Detected    Bordetella pertussis pcr Not Detected Not Detected    Bordetella parapertussis PCR Not Detected Not Detected    Chlamydophila pneumoniae PCR Not Detected Not Detected    Mycoplasma pneumo by PCR Not Detected Not Detected       If labs were ordered, I independently reviewed the results.        RADIOLOGY  Ct Angiogram Chest With & Without Contrast    Result Date: 2/22/2021  EXAMINATION: CT ANGIOGRAM CHEST W WO CONTRAST-  INDICATION: covid sx x 1 wk,worsening soa, cough  TECHNIQUE: Axial pulmonary arterial phase IV contrast enhanced CT angiogram of the chest per PE protocol. Two-dimensional reconstructions were postprocessed.  The radiation dose reduction device was turned on for each scan per the ALARA (As Low as Reasonably Achievable) protocol.  COMPARISON: NONE  FINDINGS: No pathologic axillary adenopathy or other worrisome body wall soft tissue finding in the chest. No acute findings in the partially imaged upper abdomen. No pleural or pericardial effusion. No pathologic mediastinal or hilar adenopathy. Evaluation of the osseous structures demonstrates no evidence of acute fracture or aggressive osseous lesion. The pulmonary arteries are well-opacified, without evidence of filling defect concerning for acute pulmonary embolus. There are background centrilobular emphysematous changes, with few very mild superimposed peripheral areas of groundglass opacity most notable in the dependent portion of the right upper lobe and at the right lung base. There are no suspicious pulmonary nodules.       No pulmonary embolus. A few mild scattered areas of peripheral groundglass opacity in the dependent portion of the right upper and right lower lobes, fairly nonspecific and indeterminate for Covid 19 related pneumonia.   This report was finalized on 2/22/2021 2:26 PM by Lamberto Doty.          PROCEDURES    Procedures    No orders to display       MEDICATIONS GIVEN IN ER    Medications   sodium chloride 0.9 % bolus 1,000 mL (0 mL Intravenous Stopped 2/22/21 1405)   iopamidol (ISOVUE-370) 76 % injection 100 mL (80 mL Intravenous Given 2/22/21 1358)   dexamethasone (DECADRON) tablet 6 mg (6 mg Oral Given 2/22/21 1457)         PROGRESS, DATA ANALYSIS, CONSULTS, AND MEDICAL DECISION MAKING    All labs have been independently reviewed by me.  All radiology studies have been reviewed by me and the radiologist dictating the report.   EKG's have been independently viewed and interpreted by me.            ED Course as of Feb 22 2151   Mon Feb 22, 2021   1421 COVID19(!!): Detected [TG]   1423 Sodium(!): 126 [TG]   1423 Chloride(!): 93 [TG]      ED Course User Index  [TG] Richard Cherry PA-C             AS OF 21:51 EST VITALS:    BP - 112/53  HR - 68  TEMP - 98 °F (36.7 °C) (Oral)  O2 SATS - 96%        DIAGNOSIS  Final diagnoses:   COVID-19 virus infection   Hyponatremia         DISPOSITION  DISCHARGE    Patient discharged in stable condition.    Reviewed implications of results, diagnosis, meds, responsibility to follow up, warning signs and symptoms of possible worsening, potential complications and reasons to return to ER.    Patient/Family voiced understanding of above instructions.    Discussed plan for discharge, as there is no emergent indication for admission.  Pt/family is agreeable and understands need for follow up and possible repeat testing.  Pt/family is aware that discharge does not mean that nothing is wrong but that it indicates no emergency is currently present that requires admission and they  must continue care with follow-up as given below or with a physician of their choice.     FOLLOW-UP  Remi Morris MD  [facility] [id number] [phone]    Schedule an appointment as soon as possible for a visit in 1 day  Follow-up via telephone tomorrow         Medication List      New Prescriptions    azithromycin 250 MG tablet  Commonly known as: Zithromax Z-Jamar  Take 2 tablets the first day, then 1 tablet daily for 4 days.     ondansetron 4 MG tablet  Commonly known as: Zofran  Take 1 tablet by mouth Every 8 (Eight) Hours As Needed for Nausea or Vomiting.           Where to Get Your Medications      These medications were sent to [facility] [address] [place] [address] [phone]  - [phone]   [address] [facility] [address]    Phone: [phone]   · azithromycin 250 MG tablet  · ondansetron 4 MG tablet                  Richard Cherry PA-C  02/22/21 3875

## 2021-02-23 ENCOUNTER — READMISSION MANAGEMENT (OUTPATIENT)
Dept: CALL CENTER | Facility: HOSPITAL | Age: 76
End: 2021-02-23

## 2021-02-23 NOTE — OUTREACH NOTE
Prep Survey      Responses   [facility] patient discharged from?  [place]   Is LACE score < 7 ?  No   Emergency Room discharge w/ pulse ox?  Yes   Eligibility  Readm Mgmt   Discharge diagnosis  COVID-19 virus infection   Does the patient have one of the following disease processes/diagnoses(primary or secondary)?  COVID-19   Does the patient have Home health ordered?  No   Is there a DME ordered?  Yes   What DME was ordered?  Sent home with pulse oximeter   General alerts for this patient  ED vuisit-sent home with pulse oximeter   Prep survey completed?  Yes          Irina Mensah RN

## 2021-02-23 NOTE — OUTREACH NOTE
COVID-19 Week 1 Survey      Responses   [facility] patient discharged from?  [place]   Does the patient have one of the following disease processes/diagnoses(primary or secondary)?  COVID-19   COVID-19 underlying condition?  None   Call Number  Call 1   Week 1 Call successful?  Yes   Call start time  1115   Call end time  1120   Discharge diagnosis  COVID-19 virus infection   Meds reviewed with patient/caregiver?  Yes   Is the patient having any side effects they believe may be caused by any medication additions or changes?  No   Does the patient have all medications ordered at discharge?  Yes   Is the patient taking all medications as directed (includes completed medication regime)?  Yes   Does the patient have a primary care provider?   Yes   Comments regarding PCP  Pt will make PCP fu appt   Does the patient have an appointment with their PCP or specialist within 7 days of discharge?  No   What is preventing the patient from scheduling follow up appointments within 7 days of discharge?  Haven't had time   Nursing Interventions  Educated patient on importance of making appointment, Advised patient to make appointment   Has the patient kept scheduled appointments due by today?  N/A   What DME was ordered?  Sent home with pulse oximeter   Has all DME been delivered?  Yes   Psychosocial issues?  No   Did the patient receive a copy of their discharge instructions?  Yes   Did the patient receive a copy of COVID-19 specific instructions?  Yes   Nursing interventions  Reviewed instructions with patient   What is the patient's perception of their health status since discharge?  Improving   Does the patient have any of the following symptoms?  Cough   Nursing Interventions  Nurse provided patient education   Pulse Ox monitoring  Intermittent   Pulse Ox device source  Patient   O2 Sat comments  95% RA   O2 Sat: education provided  Sat levels, Monitoring frequency, When to seek care   Is the patient/caregiver able to  teach back steps to recovery at home?  Set small, achievable goals for return to baseline health, Rest and rebuild strength, gradually increase activity, Eat a well-balance diet   If the patient is a current smoker, are they able to teach back resources for cessation?  Not a smoker   Is the patient/caregiver able to teach back the hierarchy of who to call/visit for symptoms/problems? PCP, Specialist, Home health nurse, Urgent Care, ED, 911  Yes   COVID-19 call completed?  Yes   Wrap up additional comments  Pt states she is doing better, and now eating. Pt will make PCP fu appt today.          Chasity Durand RN

## 2021-02-24 ENCOUNTER — READMISSION MANAGEMENT (OUTPATIENT)
Dept: CALL CENTER | Facility: HOSPITAL | Age: 76
End: 2021-02-24

## 2021-02-24 NOTE — OUTREACH NOTE
COVID-19 Week 1 Survey      Responses   [facility] patient discharged from?  [place]   Does the patient have one of the following disease processes/diagnoses(primary or secondary)?  COVID-19   COVID-19 underlying condition?  None   Call Number  Call 2   Week 1 Call successful?  Yes   Call start time  1145   Call end time  1159   Meds reviewed with patient/caregiver?  Yes   Is the patient having any side effects they believe may be caused by any medication additions or changes?  No   Does the patient have all medications ordered at discharge?  Yes   Is the patient taking all medications as directed (includes completed medication regime)?  Yes   Does the patient have a primary care provider?   Yes   Does the patient have an appointment with their PCP or specialist within 7 days of discharge?  No   What is preventing the patient from scheduling follow up appointments within 7 days of discharge?  Haven't had time   Nursing Interventions  Advised patient to make appointment, Educated patient on importance of making appointment   Has the patient kept scheduled appointments due by today?  N/A   Has home health visited the patient within 72 hours of discharge?  N/A   Psychosocial issues?  No   What is the patient's perception of their health status since discharge?  Worsening   Does the patient have any of the following symptoms?  Cough, Shortness of breath [Chest pressure.]   Nursing Interventions  Nurse provided patient education   Pulse Ox monitoring  Intermittent   Pulse Ox device source  Patient   O2 Sat comments  95-96% on RA   O2 Sat: education provided  Sat levels, Monitoring frequency, When to seek care   O2 Sat education comments  Advised to return to ER if O2 sats remain below 92%.   Is the patient/caregiver able to teach back the hierarchy of who to call/visit for symptoms/problems? PCP, Specialist, Home health nurse, Urgent Care, ED, 911  Yes   COVID-19 call completed?  Yes   Wrap up additional comments  " Patient states is worsening today. Reports feeling \"shaky\", weakness, low back pain, chest pressure with worsening SOA. States temp 97.5. Advised to call 911 or return to ER immediately for evaluation. States does not want to go to ER, but understands need to return to ER. Notified [facility] ER of pending arrival.          Sarah Delgadillo RN  "

## 2021-02-25 ENCOUNTER — READMISSION MANAGEMENT (OUTPATIENT)
Dept: CALL CENTER | Facility: HOSPITAL | Age: 76
End: 2021-02-25

## 2021-02-25 NOTE — OUTREACH NOTE
COVID-19 Week 1 Survey      Responses   [facility] patient discharged from?  [place]   Does the patient have one of the following disease processes/diagnoses(primary or secondary)?  COVID-19   COVID-19 underlying condition?  None   Call Number  Call 3   Week 1 Call successful?  Yes   Call start time  1407   Call end time  1409   Discharge diagnosis  COVID-19 virus infection   Meds reviewed with patient/caregiver?  Yes   Is the patient having any side effects they believe may be caused by any medication additions or changes?  No   Does the patient have all medications ordered at discharge?  Yes   Is the patient taking all medications as directed (includes completed medication regime)?  Yes   Does the patient have a primary care provider?   Yes   Has the patient kept scheduled appointments due by today?  N/A   Has home health visited the patient within 72 hours of discharge?  N/A   What DME was ordered?  Sent home with pulse oximeter   Has all DME been delivered?  Yes   Psychosocial issues?  No   Did the patient receive a copy of their discharge instructions?  Yes   Did the patient receive a copy of COVID-19 specific instructions?  Yes   Nursing interventions  Reviewed instructions with patient   What is the patient's perception of their health status since discharge?  Improving   Does the patient have any of the following symptoms?  Shortness of breath, Cough   Nursing Interventions  Nurse provided patient education   Pulse Ox monitoring  Intermittent   Pulse Ox device source  Patient, Hospital   O2 Sat comments  96% RA   O2 Sat: education provided  Sat levels, Monitoring frequency, When to seek care   O2 Sat education comments  Advised to return to ER if O2 sats remain below 92%.   Is the patient/caregiver able to teach back steps to recovery at home?  Set small, achievable goals for return to baseline health, Rest and rebuild strength, gradually increase activity, Eat a well-balance diet, Make a list of  questions for provider's appointment   If the patient is a current smoker, are they able to teach back resources for cessation?  Not a smoker   Is the patient/caregiver able to teach back the hierarchy of who to call/visit for symptoms/problems? PCP, Specialist, Home health nurse, Urgent Care, ED, 911  Yes   Wrap up additional comments  Televisit tomorrow with PCP. Says she finally feels some better.          Mirian Tyson RN

## 2021-03-11 DIAGNOSIS — G62.9 POLYNEUROPATHY: ICD-10-CM

## 2021-03-11 RX ORDER — GABAPENTIN 100 MG/1
CAPSULE ORAL
Qty: 90 CAPSULE | Refills: 3 | Status: SHIPPED | OUTPATIENT
Start: 2021-03-11 | End: 2021-04-19 | Stop reason: SDUPTHER

## 2021-03-11 NOTE — TELEPHONE ENCOUNTER
PT IS OUT OF THE MEDICATION gabapentin (Neurontin) AND NEEDS AUTHORIZATION TO GET IT REFILLED AT THE [facility]

## 2021-04-19 ENCOUNTER — OFFICE VISIT (OUTPATIENT)
Dept: NEUROLOGY | Facility: CLINIC | Age: 76
End: 2021-04-19

## 2021-04-19 VITALS
TEMPERATURE: 97.8 F | HEART RATE: 75 BPM | WEIGHT: 149.4 LBS | BODY MASS INDEX: 25.51 KG/M2 | SYSTOLIC BLOOD PRESSURE: 118 MMHG | HEIGHT: 64 IN | OXYGEN SATURATION: 100 % | DIASTOLIC BLOOD PRESSURE: 68 MMHG

## 2021-04-19 DIAGNOSIS — G62.9 POLYNEUROPATHY: Primary | ICD-10-CM

## 2021-04-19 DIAGNOSIS — M79.7 FIBROMYALGIA: ICD-10-CM

## 2021-04-19 PROCEDURE — 99213 OFFICE O/P EST LOW 20 MIN: CPT | Performed by: PSYCHIATRY & NEUROLOGY

## 2021-04-19 RX ORDER — GABAPENTIN 100 MG/1
CAPSULE ORAL
Qty: 90 CAPSULE | Refills: 3 | Status: SHIPPED | OUTPATIENT
Start: 2021-04-19 | End: 2021-09-27 | Stop reason: SDUPTHER

## 2021-04-19 NOTE — PROGRESS NOTES
Subjective:    CC: Millie De La Fuente is seen today  for Peripheral Neuropathy       HPI:  Current visit-patient states that both the pain in her feet and her arthralgias remain well controlled with gabapentin.  She does have occasional sharp pains in her soles which she attributes to plantar fasciitis (for which she is wearing insoles).  She mostly takes a dose of 200 mg at night but when her feet bother her too much she takes 300 mg.  She has stopped feeling of balance after reducing the dose.  Denies having any major arthralgias now but does have intense fatigue as she had Covid in February.  Her sodium level was low at the time (126) and she has not had that rechecked.    Last visit-patient states that she feels a lot better after starting gabapentin now at 300 mg nightly as her feet have stopped hurting.  However she has felt slightly off balance and lightheaded on standing up and walking occasionally.  In fact she sustained a fall last week hurting her left foot.  Has not checked her blood pressure to make sure that it is running within normal limits as her blood pressure machine is broken.    Last visit-since her last visit patient had her EMG/NCS that showed mild peripheral neuropathy.  She also had blood work which was normal other than slightly high A1c of 5.7.  Today she tells me that she tried low doses of Cymbalta for years ago for fibromyalgia but had side effects of dizziness however was coming off of her antibiotics for an infection at the time so does not know if the side effects were caused by the antibiotics or the Cymbalta.  She continues to have burning in her feet as well as generalized myalgias.    Initial visit- 74-year-old female with a history of GERD, fibromyalgia, hyperlipidemia presents with diffuse pain in her hands and feet.  As per patient she started having burning pain in the bottoms of her feet as well as numbness about 20 years ago but over time her symptoms have progressed into her legs  as well as her hands.  She states her arms are very sensitive to touch and wearing something tight can hurt a lot.  She also has diffuse pain in her joints including her shoulders and hips.  Was diagnosed with fibromyalgia several years ago but was never started on any medications as she was afraid of the side effects.  The diffuse pain is now affecting the quality of her life as she does not take part in several activities such as swimming anymore.  She denies having any history of diabetes, heavy alcohol intake or family history of neuropathy.  She grew up in [place] and took part in a lot of winter activities which she feels may have brought on the neuropathy.  Has had brain scans in the past that ruled out multiple sclerosis.    The following portions of the patient's history were reviewed today and updated as of 06/26/2020  : allergies, current medications, past family history, past medical history, past social history, past surgical history and problem list  These document will be scanned to patient's chart.      Current Outpatient Medications:   •  acetaminophen (TYLENOL) 500 MG tablet, Take 400 mg by mouth Every 6 (Six) Hours As Needed for Mild Pain ., Disp: , Rfl:   •  atorvastatin (LIPITOR) 40 MG tablet, Take 40 mg by mouth Daily., Disp: , Rfl:   •  azithromycin (Zithromax Z-Jamar) 250 MG tablet, Take 2 tablets the first day, then 1 tablet daily for 4 days., Disp: 6 tablet, Rfl: 0  •  Cholecalciferol (VITAMIN D3) 125 MCG (5000 UT) capsule capsule, Take 5,000 Units by mouth Daily., Disp: , Rfl:   •  gabapentin (Neurontin) 100 MG capsule, Take 2 to 3 tablets as needed at night, Disp: 90 capsule, Rfl: 3  •  guaiFENesin (MUCINEX) 600 MG 12 hr tablet, Take 600 mg by mouth Daily As Needed., Disp: , Rfl:   •  Multiple Vitamins-Minerals (HAIR SKIN & NAILS ADVANCED PO), Take  by mouth., Disp: , Rfl:   •  Omega-3 Fatty Acids (FISH OIL) 1000 MG capsule capsule, Take  by mouth Daily With Breakfast., Disp: , Rfl:   •   "omeprazole (priLOSEC) 40 MG capsule, Take 40 mg by mouth Daily., Disp: , Rfl:   •  ondansetron (Zofran) 4 MG tablet, Take 1 tablet by mouth Every 8 (Eight) Hours As Needed for Nausea or Vomiting., Disp: 20 tablet, Rfl: 0  •  Probiotic Product (UP4 PROBIOTICS ADULT PO), Take  by mouth., Disp: , Rfl:    Past Medical History:   Diagnosis Date   • GERD (gastroesophageal reflux disease)    • Hyperlipidemia       Past Surgical History:   Procedure Laterality Date   • HYSTERECTOMY     • SHOULDER SURGERY     • TONSILLECTOMY        No family history on file.   Social History     Socioeconomic History   • Marital status:      Spouse name: Not on file   • Number of children: Not on file   • Years of education: Not on file   • Highest education level: Not on file   Tobacco Use   • Smoking status: Former Smoker     Quit date:      Years since quittin.3   • Smokeless tobacco: Never Used   Substance and Sexual Activity   • Alcohol use: Never   • Drug use: Never   • Sexual activity: Defer     Review of Systems   Constitutional: Positive for fatigue.   Musculoskeletal: Positive for arthralgias.   Neurological: Positive for numbness.   All other systems reviewed and are negative.      Objective:    /68   Pulse 75   Temp 97.8 °F (36.6 °C)   Ht 162.6 cm (64.02\")   Wt 67.8 kg (149 lb 6.4 oz)   SpO2 100%   BMI 25.63 kg/m²     Neurology Exam:    General apperance: NAD.     Mental status: Alert, awake and oriented to time place and person.    Recent and Remote memory: Intact.    Attention span and Concentration: Normal.     Language and Speech: Intact- No dysarthria.    Fluency, Naming , Repitition and Comprehension:  Intact    Cranial Nerves:   CN II: Visual fields are full. Intact. Fundi - Normal, No papillederma, Pupils - PAZ  CN III, IV and VI: Extraocular movements are intact. Normal saccades.   CN V: Facial sensation is intact.   CN VII: Muscles of facial expression reveal no asymmetry. Intact.   CN " VIII: Hearing is intact. Whispered voice intact.   CN IX and X: Palate elevates symmetrically. Intact  CN XI: Shoulder shrug is intact.   CN XII: Tongue is midline without evidence of atrophy or fasciculation.     Ophthalmoscopic exam of optic disc-normal    Motor:  Right UE muscle strength 5/5. Normal tone.     Left UE muscle strength 5/5. Normal tone.      Right LE muscle strength5/5. Normal tone.     Left LE muscle strength 5/5. Normal tone.      Sensory: Mildly reduced to pinprick in both feet to level of ankles as well as slightly reduced vibration.  She had hyperesthesia to light touch in upper extremities    DTRs: 2+ bilaterally in upper and lower extremities.    Babinski: Negative bilaterally.    Co-ordination: Normal finger-to-nose, heel to shin B/L.    Rhomberg: Negative.    Gait: Normal.    Cardiovascular: Regular rate and rhythm without murmur, gallop or rub.    Assessment and Plan:  1. Polyneuropathy  She most likely has idiopathic neuropathy.  EMG/NCS showed mild neuropathy.    For her prediabetes I had asked her to carry on dietary modifications  I have told her to continue taking gabapentin 200 to 300 mg at night  She can also start vitamin B12 supplements for her fatigue.  Her last level was 502    2. Fibromyalgia  Continue gabapentin  I may start her on low doses of Cymbalta if she has worsening arthralgias    3.  Hyponatremia  I have made her aware of this.  She will speak to her PCP about repeating lab work    Return in about 5 months (around 9/19/2021).         Madonna Irene MD

## 2021-04-30 ENCOUNTER — TELEPHONE (OUTPATIENT)
Dept: NEUROLOGY | Facility: CLINIC | Age: 76
End: 2021-04-30

## 2021-04-30 NOTE — TELEPHONE ENCOUNTER
Caller: GWEN WITH [facility]  Best call back number: [phone]     What form or medical record are you requesting: OV NOTE 4- DR. DANG & LABS 2-     Who is requesting this form or medical record from you:[facility]    How would you like to receive the form or medical records (pick-up, mail, fax):FAX  If fax, what is the fax number:  [phone] ATTN: GWEN    Timeframe paperwork needed: TODAY    Additional notes: GWEN IS NEEDING OV NOTES FROM VISIT 4- & LAB RESULTS FROM 2-

## 2021-07-26 ENCOUNTER — APPOINTMENT (OUTPATIENT)
Dept: WOMENS IMAGING | Facility: HOSPITAL | Age: 76
End: 2021-07-26

## 2021-07-26 PROCEDURE — 77067 SCR MAMMO BI INCL CAD: CPT | Performed by: RADIOLOGY

## 2021-09-27 ENCOUNTER — OFFICE VISIT (OUTPATIENT)
Dept: NEUROLOGY | Facility: CLINIC | Age: 76
End: 2021-09-27

## 2021-09-27 VITALS
BODY MASS INDEX: 24.59 KG/M2 | HEIGHT: 64 IN | SYSTOLIC BLOOD PRESSURE: 118 MMHG | OXYGEN SATURATION: 99 % | HEART RATE: 93 BPM | DIASTOLIC BLOOD PRESSURE: 81 MMHG | WEIGHT: 144 LBS

## 2021-09-27 DIAGNOSIS — G62.9 POLYNEUROPATHY: Primary | ICD-10-CM

## 2021-09-27 DIAGNOSIS — M79.7 FIBROMYALGIA: ICD-10-CM

## 2021-09-27 PROCEDURE — 99213 OFFICE O/P EST LOW 20 MIN: CPT | Performed by: PSYCHIATRY & NEUROLOGY

## 2021-09-27 RX ORDER — GABAPENTIN 100 MG/1
CAPSULE ORAL
Qty: 90 CAPSULE | Refills: 5 | Status: SHIPPED | OUTPATIENT
Start: 2021-09-27 | End: 2022-04-04 | Stop reason: SDUPTHER

## 2021-09-27 RX ORDER — MULTIPLE VITAMINS W/ MINERALS TAB 9MG-400MCG
TAB ORAL
COMMUNITY
Start: 2021-06-11 | End: 2022-12-16

## 2021-09-27 RX ORDER — DIPHENOXYLATE HYDROCHLORIDE AND ATROPINE SULFATE 2.5; .025 MG/1; MG/1
1 TABLET ORAL DAILY
COMMUNITY
End: 2023-03-24

## 2021-09-27 RX ORDER — ALENDRONATE SODIUM 70 MG/1
TABLET ORAL
COMMUNITY
Start: 2021-08-20 | End: 2023-03-24

## 2021-09-27 NOTE — PROGRESS NOTES
Subjective:    CC: Millie De La Fuente is seen today  for Polyneuropathy (5 month follow up )       HPI:  Current visit-patient states that the pain in her feet is well controlled with gabapentin 200 mg at night however she continues to have myalgias and arthralgias.  She was taking ibuprofen frequently but has cut back on her use.  May take an additional tablet of gabapentin at night as needed.  Tried Cymbalta but it made her very sick.  Her sodium level has improved to 138 since she cut back on her ibuprofen.  Blood glucose was still slightly elevated at 110.  She will be starting Prolia soon.      Last visit-patient states that both the pain in her feet and her arthralgias remain well controlled with gabapentin.  She does have occasional sharp pains in her soles which she attributes to plantar fasciitis (for which she is wearing insoles).  She mostly takes a dose of 200 mg at night but when her feet bother her too much she takes 300 mg.  She has stopped feeling of balance after reducing the dose.  Denies having any major arthralgias now but does have intense fatigue as she had Covid in February.  Her sodium level was low at the time (126) and she has not had that rechecked.    Last visit-patient states that she feels a lot better after starting gabapentin now at 300 mg nightly as her feet have stopped hurting.  However she has felt slightly off balance and lightheaded on standing up and walking occasionally.  In fact she sustained a fall last week hurting her left foot.  Has not checked her blood pressure to make sure that it is running within normal limits as her blood pressure machine is broken.    Last visit-since her last visit patient had her EMG/NCS that showed mild peripheral neuropathy.  She also had blood work which was normal other than slightly high A1c of 5.7.  Today she tells me that she tried low doses of Cymbalta for years ago for fibromyalgia but had side effects of dizziness however was coming off of her  antibiotics for an infection at the time so does not know if the side effects were caused by the antibiotics or the Cymbalta.  She continues to have burning in her feet as well as generalized myalgias.    Initial visit- 74-year-old female with a history of GERD, fibromyalgia, hyperlipidemia presents with diffuse pain in her hands and feet.  As per patient she started having burning pain in the bottoms of her feet as well as numbness about 20 years ago but over time her symptoms have progressed into her legs as well as her hands.  She states her arms are very sensitive to touch and wearing something tight can hurt a lot.  She also has diffuse pain in her joints including her shoulders and hips.  Was diagnosed with fibromyalgia several years ago but was never started on any medications as she was afraid of the side effects.  The diffuse pain is now affecting the quality of her life as she does not take part in several activities such as swimming anymore.  She denies having any history of diabetes, heavy alcohol intake or family history of neuropathy.  She grew up in [place] and took part in a lot of winter activities which she feels may have brought on the neuropathy.  Has had brain scans in the past that ruled out multiple sclerosis.    The following portions of the patient's history were reviewed today and updated as of 06/26/2020  : allergies, current medications, past family history, past medical history, past social history, past surgical history and problem list  These document will be scanned to patient's chart.      Current Outpatient Medications:   •  acetaminophen (TYLENOL) 500 MG tablet, Take 400 mg by mouth Every 6 (Six) Hours As Needed for Mild Pain ., Disp: , Rfl:   •  alendronate (FOSAMAX) 70 MG tablet, take 1 po q week with glass of water, then NPO x 30min and must remain upright for 30min, Disp: , Rfl:   •  atorvastatin (LIPITOR) 40 MG tablet, Take 40 mg by mouth Daily., Disp: , Rfl:   •  Calcium  Carbonate-Vitamin D (calcium-vitamin D) 500-200 MG-UNIT tablet per tablet, , Disp: , Rfl:   •  Cholecalciferol (VITAMIN D3) 125 MCG (5000 UT) capsule capsule, Take 5,000 Units by mouth Daily., Disp: , Rfl:   •  cyanocobalamin (VITAMIN B-12) 1000 MCG tablet, , Disp: , Rfl:   •  gabapentin (Neurontin) 100 MG capsule, Take 3 tablets at night, Disp: 90 capsule, Rfl: 5  •  guaiFENesin (MUCINEX) 600 MG 12 hr tablet, Take 600 mg by mouth Daily As Needed., Disp: , Rfl:   •  Multiple Vitamins-Minerals (HAIR SKIN & NAILS ADVANCED PO), Take  by mouth., Disp: , Rfl:   •  Multiple Vitamins-Minerals (PRESERVISION AREDS 2 PO), take one tablet po qd, Disp: , Rfl:   •  multivitamin (multivitamin) tablet tablet, Take 1 tablet by mouth Daily., Disp: , Rfl:   •  multivitamin with minerals (HAIR SKIN AND NAILS FORMULA PO), one tablet daily, Disp: , Rfl:   •  Omega-3 Fatty Acids (FISH OIL) 1000 MG capsule capsule, Take  by mouth Daily With Breakfast., Disp: , Rfl:   •  omeprazole (priLOSEC) 40 MG capsule, Take 40 mg by mouth Daily., Disp: , Rfl:   •  ondansetron (Zofran) 4 MG tablet, Take 1 tablet by mouth Every 8 (Eight) Hours As Needed for Nausea or Vomiting., Disp: 20 tablet, Rfl: 0  •  Probiotic Product (UP4 PROBIOTICS ADULT PO), Take  by mouth., Disp: , Rfl:    Past Medical History:   Diagnosis Date   • GERD (gastroesophageal reflux disease)    • Hyperlipidemia       Past Surgical History:   Procedure Laterality Date   • HYSTERECTOMY     • SHOULDER SURGERY     • TONSILLECTOMY        Family History   Problem Relation Age of Onset   • Hypertension Mother    • Stroke Mother    • Heart failure Father    • Breast cancer Sister       Social History     Socioeconomic History   • Marital status:      Spouse name: Not on file   • Number of children: Not on file   • Years of education: Not on file   • Highest education level: Not on file   Tobacco Use   • Smoking status: Former Smoker     Quit date:      Years since quittin.7  "  • Smokeless tobacco: Never Used   Vaping Use   • Vaping Use: Never used   Substance and Sexual Activity   • Alcohol use: Never   • Drug use: Never   • Sexual activity: Defer     Review of Systems   Constitutional: Positive for fatigue.   Musculoskeletal: Positive for arthralgias.   Neurological: Positive for numbness.   All other systems reviewed and are negative.      Objective:    /81   Pulse 93   Ht 162.6 cm (64\")   Wt 65.3 kg (144 lb)   SpO2 99%   Breastfeeding No   BMI 24.72 kg/m²     Neurology Exam:    General apperance: NAD.     Mental status: Alert, awake and oriented to time place and person.    Recent and Remote memory: Intact.    Attention span and Concentration: Normal.     Language and Speech: Intact- No dysarthria.    Fluency, Naming , Repitition and Comprehension:  Intact    Cranial Nerves:   CN II: Visual fields are full. Intact. Fundi - Normal, No papillederma, Pupils - PAZ  CN III, IV and VI: Extraocular movements are intact. Normal saccades.   CN V: Facial sensation is intact.   CN VII: Muscles of facial expression reveal no asymmetry. Intact.   CN VIII: Hearing is intact. Whispered voice intact.   CN IX and X: Palate elevates symmetrically. Intact  CN XI: Shoulder shrug is intact.   CN XII: Tongue is midline without evidence of atrophy or fasciculation.     Ophthalmoscopic exam of optic disc-normal    Motor:  Right UE muscle strength 5/5. Normal tone.     Left UE muscle strength 5/5. Normal tone.      Right LE muscle strength5/5. Normal tone.     Left LE muscle strength 5/5. Normal tone.      Sensory: Mildly reduced to pinprick in both feet to level of ankles as well as slightly reduced vibration.  She had hyperesthesia to light touch in upper extremities    DTRs: 2+ bilaterally in upper and lower extremities.    Babinski: Negative bilaterally.    Co-ordination: Normal finger-to-nose, heel to shin B/L.    Rhomberg: Negative.    Gait: Normal.    Cardiovascular: Regular rate and " rhythm without murmur, gallop or rub.    Assessment and Plan:    1. Polyneuropathy  She most likely has idiopathic neuropathy.  EMG/NCS showed mild neuropathy.    For her prediabetes I had asked her to carry on dietary modifications  I have told her to increase her gabapentin to 300 mg at night every day  She is taking B12 supplements.  Her last level was 502    2. Fibromyalgia  Continue gabapentin.  She can increase her dose to see if it would help with her myalgias.      Return in about 9 months (around 6/27/2022).         Madonna Irene MD

## 2021-11-03 DIAGNOSIS — G62.9 POLYNEUROPATHY: ICD-10-CM

## 2021-11-03 RX ORDER — GABAPENTIN 100 MG/1
CAPSULE ORAL
Qty: 90 CAPSULE | Refills: 0 | OUTPATIENT
Start: 2021-11-03

## 2021-11-03 NOTE — TELEPHONE ENCOUNTER
Patient informed that refill sent on 09/27/2021 with 5 additional refills she was advised to contact her pharmacy for refills. Patient verbalized all understanding. I will refuse this request.   -TMT

## 2022-03-24 ENCOUNTER — OFFICE VISIT (OUTPATIENT)
Dept: FAMILY MEDICINE CLINIC | Facility: CLINIC | Age: 77
End: 2022-03-24

## 2022-03-24 VITALS
HEART RATE: 77 BPM | OXYGEN SATURATION: 98 % | BODY MASS INDEX: 23.73 KG/M2 | HEIGHT: 64 IN | SYSTOLIC BLOOD PRESSURE: 114 MMHG | TEMPERATURE: 98.1 F | DIASTOLIC BLOOD PRESSURE: 60 MMHG | WEIGHT: 139 LBS

## 2022-03-24 DIAGNOSIS — G62.9 POLYNEUROPATHY: ICD-10-CM

## 2022-03-24 DIAGNOSIS — M81.0 SENILE OSTEOPOROSIS: ICD-10-CM

## 2022-03-24 DIAGNOSIS — J01.01 ACUTE RECURRENT MAXILLARY SINUSITIS: ICD-10-CM

## 2022-03-24 DIAGNOSIS — Z79.899 HIGH RISK MEDICATION USE: ICD-10-CM

## 2022-03-24 DIAGNOSIS — R53.83 FATIGUE, UNSPECIFIED TYPE: ICD-10-CM

## 2022-03-24 DIAGNOSIS — N39.0 ACUTE UTI: ICD-10-CM

## 2022-03-24 DIAGNOSIS — J30.1 NON-SEASONAL ALLERGIC RHINITIS DUE TO POLLEN: ICD-10-CM

## 2022-03-24 DIAGNOSIS — E55.9 VITAMIN D DEFICIENCY: ICD-10-CM

## 2022-03-24 DIAGNOSIS — E78.2 MIXED HYPERLIPIDEMIA: ICD-10-CM

## 2022-03-24 DIAGNOSIS — A08.4 VIRAL GASTROENTERITIS: ICD-10-CM

## 2022-03-24 DIAGNOSIS — M16.0 PRIMARY OSTEOARTHRITIS OF BOTH HIPS: ICD-10-CM

## 2022-03-24 DIAGNOSIS — R11.0 NAUSEA: ICD-10-CM

## 2022-03-24 DIAGNOSIS — K21.9 GASTROESOPHAGEAL REFLUX DISEASE WITHOUT ESOPHAGITIS: ICD-10-CM

## 2022-03-24 DIAGNOSIS — H35.30 MACULAR DEGENERATION, UNSPECIFIED LATERALITY, UNSPECIFIED TYPE: ICD-10-CM

## 2022-03-24 DIAGNOSIS — M79.7 FIBROMYALGIA: ICD-10-CM

## 2022-03-24 DIAGNOSIS — K29.00 ACUTE GASTRITIS WITHOUT HEMORRHAGE, UNSPECIFIED GASTRITIS TYPE: Primary | ICD-10-CM

## 2022-03-24 LAB
BILIRUB BLD-MCNC: NEGATIVE MG/DL
CLARITY, POC: CLEAR
COLOR UR: YELLOW
GLUCOSE UR STRIP-MCNC: NEGATIVE MG/DL
KETONES UR QL: NEGATIVE
LEUKOCYTE EST, POC: NEGATIVE
NITRITE UR-MCNC: NEGATIVE MG/ML
PH UR: 6 [PH] (ref 5–8)
PROT UR STRIP-MCNC: NEGATIVE MG/DL
RBC # UR STRIP: NEGATIVE /UL
SP GR UR: 1.01 (ref 1–1.03)
UROBILINOGEN UR QL: NORMAL

## 2022-03-24 PROCEDURE — 99214 OFFICE O/P EST MOD 30 MIN: CPT | Performed by: FAMILY MEDICINE

## 2022-03-24 PROCEDURE — 81002 URINALYSIS NONAUTO W/O SCOPE: CPT | Performed by: FAMILY MEDICINE

## 2022-03-24 RX ORDER — SUCRALFATE 1 G/1
1 TABLET ORAL 4 TIMES DAILY
Qty: 120 TABLET | Refills: 1 | Status: SHIPPED | OUTPATIENT
Start: 2022-03-24 | End: 2022-12-16

## 2022-03-24 RX ORDER — ONDANSETRON 8 MG/1
8 TABLET, ORALLY DISINTEGRATING ORAL EVERY 8 HOURS PRN
Qty: 24 TABLET | Refills: 2 | Status: SHIPPED | OUTPATIENT
Start: 2022-03-24 | End: 2022-12-16

## 2022-03-24 RX ORDER — CEFDINIR 300 MG/1
300 CAPSULE ORAL 2 TIMES DAILY
Qty: 20 CAPSULE | Refills: 0 | Status: SHIPPED | OUTPATIENT
Start: 2022-03-24 | End: 2022-12-16

## 2022-03-24 NOTE — PROGRESS NOTES
"Chief Complaint  Nausea (Started march 8th with a stomach bug still feels nauseous. )    Subjective          Millie De La Fuente presents to [facility]  History of Present Illness  Patient developed symptoms of acute gastroenteritis on March 8.  She went to the emergency department at [facility], had a complete work-up including CT scan of the abdomen and pelvis.  CT scan just showed typical findings of acute viral gastroenteritis.  Her vomiting and diarrhea did subside, but she has continued to have malaise weakness and nausea since then along with reduced appetite.  She has been eating bland foods including boiled potatoes, some baked chicken and similar things but not her usual diet with lots of fruits and vegetables.  Therefore now she has mild constipation, but no blood in stool or melena.  No significant abdominal pain.  She has developed dysuria urgency and frequency over the last 3 days as well, but no flank pain or hematuria.  In addition, the patient has developed symptoms of acute maxillary sinusitis once again, had this just a couple months ago and was somewhat hard to get rid of.  She is somewhat prone to sinusitis.  Denies any sore throat or fever at this time.  Had fever days 2 and 3 of the viral illness mentioned above, but none since that time.  She denies any cough or shortness of breath no sputum production or wheezing.  She does have some purulent nasal discharge.  Objective   Vital Signs:   /60 (BP Location: Left arm, Patient Position: Sitting, Cuff Size: Adult)   Pulse 77   Temp 98.1 °F (36.7 °C) (Oral)   Ht 162.6 cm (64\")   Wt 63 kg (139 lb)   SpO2 98%   BMI 23.86 kg/m²     Body mass index is 23.86 kg/m².    Review of Systems   Constitutional: Positive for fatigue. Negative for chills, fever and unexpected weight loss.   HENT: Positive for congestion, rhinorrhea, sinus pressure and sneezing. Negative for ear discharge, ear pain, mouth " sores, nosebleeds, sore throat, swollen glands and trouble swallowing.    Eyes: Negative for blurred vision, double vision, pain, redness and visual disturbance.   Respiratory: Negative for cough, shortness of breath and wheezing.    Cardiovascular: Negative for chest pain, palpitations and leg swelling.        PND, orthopnea   Gastrointestinal: Positive for constipation and nausea. Negative for abdominal distention, abdominal pain, blood in stool, diarrhea and vomiting.        Dysphagia, odynophagia   Endocrine: Negative for polydipsia, polyphagia and polyuria.   Genitourinary: Positive for dysuria, frequency and urgency. Negative for flank pain, hematuria, urinary incontinence and vaginal discharge.   Musculoskeletal: Positive for arthralgias (unusual/atypica) and myalgias. Negative for gait problem and joint swelling.   Skin: Negative for rash, skin lesions (worrisome/suspicious) and bruise.   Allergic/Immunologic: Negative for food allergies.   Neurological: Negative for dizziness, tremors, seizures, syncope, weakness, numbness and headache.   Hematological: Negative for adenopathy. Does not bruise/bleed easily.   Psychiatric/Behavioral: Negative for suicidal ideas and depressed mood. The patient is not nervous/anxious.        Past History:  Medical History: has a past medical history of Allergic rhinitis due to pollen, Atypical facial pain, Chronic vertigo, Coxarthrosis, Diverticulosis, Fibromyalgia, Gastroesophageal reflux disease without esophagitis, GERD (gastroesophageal reflux disease), History of drug dependence ([facility]), Hyperlipidemia, Injury of kidney, Irritable bowel disease, Macular degeneration, Macular disorder, Migraine, Mixed hyperlipidemia, Osteoarthritis, Osteoporosis, Peripheral neuropathy, Polyneuropathy, Shingles, and Vitamin D deficiency.   Surgical History: has a past surgical history that includes Hysterectomy; Shoulder surgery; Tonsillectomy; Appendectomy; and Colonoscopy (2016).   Family  History: family history includes Alcohol abuse in an other family member; Alzheimer's disease in her mother; Aortic aneurysm in her brother; Breast cancer (age of onset: 57) in her sister; Congenital heart disease in her father; Heart failure in her father; Hyperlipidemia in her mother; Hypertension in her mother; Stroke in her mother.   Social History: reports that she quit smoking about 30 years ago. She started smoking about 57 years ago. She smoked 0.50 packs per day. She has never used smokeless tobacco. She reports previous alcohol use. She reports that she does not use drugs.      Current Outpatient Medications:   •  acetaminophen (TYLENOL) 500 MG tablet, Take 400 mg by mouth Every 6 (Six) Hours As Needed for Mild Pain ., Disp: , Rfl:   •  Calcium Carbonate-Vitamin D (calcium-vitamin D) 500-200 MG-UNIT tablet per tablet, , Disp: , Rfl:   •  Cholecalciferol (VITAMIN D3) 125 MCG (5000 UT) capsule capsule, Take 5,000 Units by mouth Daily., Disp: , Rfl:   •  cyanocobalamin (VITAMIN B-12) 1000 MCG tablet, , Disp: , Rfl:   •  gabapentin (Neurontin) 100 MG capsule, Take 3 tablets at night, Disp: 90 capsule, Rfl: 5  •  guaiFENesin (MUCINEX) 600 MG 12 hr tablet, Take 600 mg by mouth Daily As Needed., Disp: , Rfl:   •  Multiple Vitamins-Minerals (HAIR SKIN & NAILS ADVANCED PO), Take  by mouth., Disp: , Rfl:   •  omeprazole (priLOSEC) 40 MG capsule, Take 40 mg by mouth Daily., Disp: , Rfl:   •  alendronate (FOSAMAX) 70 MG tablet, take 1 po q week with glass of water, then NPO x 30min and must remain upright for 30min, Disp: , Rfl:   •  atorvastatin (LIPITOR) 40 MG tablet, Take 40 mg by mouth Daily., Disp: , Rfl:   •  cefdinir (OMNICEF) 300 MG capsule, Take 1 capsule by mouth 2 (Two) Times a Day., Disp: 20 capsule, Rfl: 0  •  Multiple Vitamins-Minerals (PRESERVISION AREDS 2 PO), take one tablet po qd, Disp: , Rfl:   •  multivitamin (THERAGRAN) tablet tablet, Take 1 tablet by mouth Daily., Disp: , Rfl:   •  multivitamin  with minerals tablet tablet, one tablet daily, Disp: , Rfl:   •  Omega-3 Fatty Acids (FISH OIL) 1000 MG capsule capsule, Take  by mouth Daily With Breakfast., Disp: , Rfl:   •  ondansetron ODT (ZOFRAN-ODT) 8 MG disintegrating tablet, Place 1 tablet on the tongue Every 8 (Eight) Hours As Needed for Nausea or Vomiting., Disp: 24 tablet, Rfl: 2  •  Probiotic Product (UP4 PROBIOTICS ADULT PO), Take  by mouth., Disp: , Rfl:   •  sucralfate (Carafate) 1 g tablet, Take 1 tablet by mouth 4 (Four) Times a Day., Disp: 120 tablet, Rfl: 1    Allergies: Codeine, Clindamycin/lincomycin, Sulfa antibiotics, and Penicillins    Physical Exam  Constitutional:       Appearance: She is ill-appearing ( Mildly ill-appearing somewhat weak).   HENT:      Head: Normocephalic.      Right Ear: Ear canal and external ear normal.      Left Ear: Ear canal and external ear normal.      Nose: Mucosal edema, congestion and rhinorrhea present. Rhinorrhea is purulent.      Right Turbinates: Swollen.      Left Turbinates: Swollen.      Right Sinus: Maxillary sinus tenderness present.      Left Sinus: Maxillary sinus tenderness present.      Mouth/Throat:      Mouth: Mucous membranes are moist. No oral lesions.      Pharynx: Oropharynx is clear.      Comments: Purulent postnasal drainage noted  Eyes:      General: No scleral icterus.     Extraocular Movements: Extraocular movements intact.      Conjunctiva/sclera: Conjunctivae normal.      Pupils: Pupils are equal, round, and reactive to light.   Neck:      Vascular: No carotid bruit.   Cardiovascular:      Rate and Rhythm: Normal rate and regular rhythm.      Pulses: Normal pulses.      Heart sounds: Normal heart sounds.   Pulmonary:      Effort: Pulmonary effort is normal.      Breath sounds: Normal breath sounds.   Chest:      Chest wall: No tenderness.   Abdominal:      General: Bowel sounds are normal. There is no distension.      Palpations: Abdomen is soft.      Tenderness: There is no abdominal  tenderness. There is no right CVA tenderness, left CVA tenderness, guarding or rebound.   Musculoskeletal:         General: No swelling or deformity. Normal range of motion.      Cervical back: No rigidity.      Right lower leg: No edema.      Left lower leg: No edema.   Lymphadenopathy:      Cervical: No cervical adenopathy.   Skin:     General: Skin is warm and dry.      Capillary Refill: Capillary refill takes less than 2 seconds.   Neurological:      General: No focal deficit present.      Mental Status: She is alert and oriented to person, place, and time.      Cranial Nerves: No cranial nerve deficit.      Coordination: Coordination normal.      Gait: Gait normal.   Psychiatric:         Mood and Affect: Mood normal.         Behavior: Behavior normal.         Judgment: Judgment normal.          Result Review :                   Assessment and Plan    Diagnoses and all orders for this visit:    1. Acute gastritis without hemorrhage, unspecified gastritis type (Primary)  I suspect the patient just has persistent gastritis due to severe gastroenteritis that she had a couple of weeks ago.  She is taking omeprazole daily, but has not seen significant improvement in symptoms other than the fact that she is no longer having any vomiting.  She has been able to take an adequate amount of food and liquid, and is not having any diarrhea blood in stool or melena.  She thinks the Zofran melt tabs works fine for her nausea, but for some reason her regular pills tonight, so will refill the former and also place her on Carafate for 1 month possibly with another month of refills to facilitate any healing of the gastritis.  We will also simultaneously refer her to GI, so that if her symptoms do not improve she can undergo EGD.  If her symptoms improve before then, we can cancel the GI consult.  2. Acute UTI  -     POC Urinalysis Dipstick  -     Urine Culture - , Urine, Random Void; Future  -     Urine Culture - Urine, Urine,  Random Void  This is uncertain as her symptoms just developed 3 days ago, but we will check urine dip and culture.  I explained that it is common for women to have a UTI following gastroenteritis.  Since she also has sinusitis symptoms starting, we will treat her with cefdinir for 10 days while awaiting the urine culture.  Her URI/sinusitis symptoms may worsen a little bit before they get better, but if she gets markedly worse, especially with fever shortness of breath or marked cough, she will return or go to emergency department.  3. Viral gastroenteritis    4. Acute recurrent maxillary sinusitis    5. Fibromyalgia    6. Non-seasonal allergic rhinitis due to pollen    7. Gastroesophageal reflux disease without esophagitis    8. High risk medication use    9. Macular degeneration, unspecified laterality, unspecified type    10. Mixed hyperlipidemia    11. Polyneuropathy    12. Primary osteoarthritis of both hips    13. Senile osteoporosis    14. Vitamin D deficiency    15. Fatigue, unspecified type    16. Nausea    Other orders  -     ondansetron ODT (ZOFRAN-ODT) 8 MG disintegrating tablet; Place 1 tablet on the tongue Every 8 (Eight) Hours As Needed for Nausea or Vomiting.  Dispense: 24 tablet; Refill: 2  -     cefdinir (OMNICEF) 300 MG capsule; Take 1 capsule by mouth 2 (Two) Times a Day.  Dispense: 20 capsule; Refill: 0  -     sucralfate (Carafate) 1 g tablet; Take 1 tablet by mouth 4 (Four) Times a Day.  Dispense: 120 tablet; Refill: 1        Follow Up   Return if symptoms worsen or fail to improve.  Patient was given instructions and counseling regarding her condition or for health maintenance advice. Please see specific information pulled into the AVS if appropriate.     Remi Morris MD

## 2022-03-26 LAB
BACTERIA UR CULT: NO GROWTH
BACTERIA UR CULT: NORMAL

## 2022-04-04 DIAGNOSIS — G62.9 POLYNEUROPATHY: ICD-10-CM

## 2022-04-04 NOTE — TELEPHONE ENCOUNTER
Caller: Millie De La Fuente    Relationship: Self    Best call back number: [phone]    Requested Prescriptions:   Requested Prescriptions     Pending Prescriptions Disp Refills   • gabapentin (Neurontin) 100 MG capsule 90 capsule 5     Sig: Take 3 tablets at night      Pharmacy where request should be sent:    [facility]    Does the patient have less than a 3 day supply:  [] Yes  [x] No    Rose Marie Hernandez Rep   04/04/22 09:43 EDT

## 2022-04-04 NOTE — TELEPHONE ENCOUNTER
Rx Refill Note  Requested Prescriptions     Pending Prescriptions Disp Refills   • gabapentin (Neurontin) 100 MG capsule 90 capsule 5     Sig: Take 3 tablets at night      Last office visit with prescribing clinician: 9/27/2021      Next office visit with prescribing clinician: 6/28/2022            Kallie Arredondo MA  04/04/22, 16:32 EDT

## 2022-04-06 RX ORDER — GABAPENTIN 100 MG/1
CAPSULE ORAL
Qty: 90 CAPSULE | Refills: 5 | Status: SHIPPED | OUTPATIENT
Start: 2022-04-06 | End: 2022-10-26

## 2022-04-11 DIAGNOSIS — G62.9 POLYNEUROPATHY: ICD-10-CM

## 2022-04-11 RX ORDER — GABAPENTIN 100 MG/1
CAPSULE ORAL
Qty: 90 CAPSULE | Refills: 0 | OUTPATIENT
Start: 2022-04-11

## 2022-04-18 DIAGNOSIS — G62.9 POLYNEUROPATHY: ICD-10-CM

## 2022-04-18 RX ORDER — GABAPENTIN 100 MG/1
CAPSULE ORAL
Qty: 90 CAPSULE | Refills: 0 | OUTPATIENT
Start: 2022-04-18

## 2022-06-01 ENCOUNTER — TRANSCRIBE ORDERS (OUTPATIENT)
Dept: MAMMOGRAPHY | Facility: CLINIC | Age: 77
End: 2022-06-01

## 2022-06-01 DIAGNOSIS — Z12.31 ENCOUNTER FOR SCREENING MAMMOGRAM FOR MALIGNANT NEOPLASM OF BREAST: Primary | ICD-10-CM

## 2022-06-28 ENCOUNTER — OFFICE VISIT (OUTPATIENT)
Dept: NEUROLOGY | Facility: CLINIC | Age: 77
End: 2022-06-28

## 2022-06-28 VITALS
BODY MASS INDEX: 23.52 KG/M2 | OXYGEN SATURATION: 100 % | RESPIRATION RATE: 16 BRPM | SYSTOLIC BLOOD PRESSURE: 128 MMHG | HEART RATE: 70 BPM | DIASTOLIC BLOOD PRESSURE: 62 MMHG | WEIGHT: 137 LBS

## 2022-06-28 DIAGNOSIS — G62.9 POLYNEUROPATHY: Primary | ICD-10-CM

## 2022-06-28 DIAGNOSIS — M79.7 FIBROMYALGIA: ICD-10-CM

## 2022-06-28 PROCEDURE — 99213 OFFICE O/P EST LOW 20 MIN: CPT | Performed by: PSYCHIATRY & NEUROLOGY

## 2022-06-28 NOTE — PROGRESS NOTES
Subjective:    CC: Millie De La Fuente is seen today  for Polyneuropathy (Patient concerned about blood vessels in rt. Hand, she would just like Dr. Irene to know this.)         Current visit-patient states that the paresthesias in her feet are much better controlled since she increased her gabapentin now at 300 mg nightly.  She cannot take a higher dose as she already feels slightly off balance while walking to the bathroom in the middle of the night.  She also continues to take vitamin B12 supplements and has been making dietary modifications for her prediabetes.  Denies any balance problems.  Has yet to start Prolia shots for her arthritis.      Last visit-patient states that the pain in her feet is well controlled with gabapentin 200 mg at night however she continues to have myalgias and arthralgias.  She was taking ibuprofen frequently but has cut back on her use.  May take an additional tablet of gabapentin at night as needed.  Tried Cymbalta but it made her very sick.  Her sodium level has improved to 138 since she cut back on her ibuprofen.  Blood glucose was still slightly elevated at 110.  She will be starting Prolia soon.      Last visit-patient states that both the pain in her feet and her arthralgias remain well controlled with gabapentin.  She does have occasional sharp pains in her soles which she attributes to plantar fasciitis (for which she is wearing insoles).  She mostly takes a dose of 200 mg at night but when her feet bother her too much she takes 300 mg.  She has stopped feeling of balance after reducing the dose.  Denies having any major arthralgias now but does have intense fatigue as she had Covid in February.  Her sodium level was low at the time (126) and she has not had that rechecked.    Last visit-patient states that she feels a lot better after starting gabapentin now at 300 mg nightly as her feet have stopped hurting.  However she has felt slightly off balance and lightheaded on standing up  and walking occasionally.  In fact she sustained a fall last week hurting her left foot.  Has not checked her blood pressure to make sure that it is running within normal limits as her blood pressure machine is broken.    Last visit-since her last visit patient had her EMG/NCS that showed mild peripheral neuropathy.  She also had blood work which was normal other than slightly high A1c of 5.7.  Today she tells me that she tried low doses of Cymbalta for years ago for fibromyalgia but had side effects of dizziness however was coming off of her antibiotics for an infection at the time so does not know if the side effects were caused by the antibiotics or the Cymbalta.  She continues to have burning in her feet as well as generalized myalgias.    Initial visit- 74-year-old female with a history of GERD, fibromyalgia, hyperlipidemia presents with diffuse pain in her hands and feet.  As per patient she started having burning pain in the bottoms of her feet as well as numbness about 20 years ago but over time her symptoms have progressed into her legs as well as her hands.  She states her arms are very sensitive to touch and wearing something tight can hurt a lot.  She also has diffuse pain in her joints including her shoulders and hips.  Was diagnosed with fibromyalgia several years ago but was never started on any medications as she was afraid of the side effects.  The diffuse pain is now affecting the quality of her life as she does not take part in several activities such as swimming anymore.  She denies having any history of diabetes, heavy alcohol intake or family history of neuropathy.  She grew up in [place] and took part in a lot of winter activities which she feels may have brought on the neuropathy.  Has had brain scans in the past that ruled out multiple sclerosis.    The following portions of the patient's history were reviewed today and updated as of 06/26/2020  : allergies, current medications, past family  history, past medical history, past social history, past surgical history and problem list  These document will be scanned to patient's chart.      Current Outpatient Medications:   •  acetaminophen (TYLENOL) 500 MG tablet, Take 400 mg by mouth Every 6 (Six) Hours As Needed for Mild Pain ., Disp: , Rfl:   •  alendronate (FOSAMAX) 70 MG tablet, take 1 po q week with glass of water, then NPO x 30min and must remain upright for 30min, Disp: , Rfl:   •  atorvastatin (LIPITOR) 40 MG tablet, Take 40 mg by mouth Daily., Disp: , Rfl:   •  Calcium Carbonate-Vitamin D (calcium-vitamin D) 500-200 MG-UNIT tablet per tablet, , Disp: , Rfl:   •  cefdinir (OMNICEF) 300 MG capsule, Take 1 capsule by mouth 2 (Two) Times a Day., Disp: 20 capsule, Rfl: 0  •  Cholecalciferol (VITAMIN D3) 125 MCG (5000 UT) capsule capsule, Take 5,000 Units by mouth Daily., Disp: , Rfl:   •  cyanocobalamin (VITAMIN B-12) 1000 MCG tablet, , Disp: , Rfl:   •  gabapentin (Neurontin) 100 MG capsule, Take 3 tablets at night, Disp: 90 capsule, Rfl: 5  •  guaiFENesin (MUCINEX) 600 MG 12 hr tablet, Take 600 mg by mouth Daily As Needed., Disp: , Rfl:   •  Multiple Vitamins-Minerals (HAIR SKIN & NAILS ADVANCED PO), Take  by mouth., Disp: , Rfl:   •  Multiple Vitamins-Minerals (PRESERVISION AREDS 2 PO), take one tablet po qd, Disp: , Rfl:   •  multivitamin (THERAGRAN) tablet tablet, Take 1 tablet by mouth Daily., Disp: , Rfl:   •  multivitamin with minerals tablet tablet, one tablet daily, Disp: , Rfl:   •  Omega-3 Fatty Acids (FISH OIL) 1000 MG capsule capsule, Take  by mouth Daily With Breakfast., Disp: , Rfl:   •  omeprazole (priLOSEC) 40 MG capsule, Take 40 mg by mouth Daily., Disp: , Rfl:   •  ondansetron ODT (ZOFRAN-ODT) 8 MG disintegrating tablet, Place 1 tablet on the tongue Every 8 (Eight) Hours As Needed for Nausea or Vomiting., Disp: 24 tablet, Rfl: 2  •  Probiotic Product (UP4 PROBIOTICS ADULT PO), Take  by mouth., Disp: , Rfl:   •  sucralfate  (Carafate) 1 g tablet, Take 1 tablet by mouth 4 (Four) Times a Day., Disp: 120 tablet, Rfl: 1   Past Medical History:   Diagnosis Date   • Allergic rhinitis due to pollen    • Atypical facial pain     syndrome with neg eurologic workup including MRI   • Chronic vertigo     neurologic and ENT workup   • Coxarthrosis     primary, bilateral   • Diverticulosis    • Fibromyalgia    • Gastroesophageal reflux disease without esophagitis    • GERD (gastroesophageal reflux disease)    • History of drug dependence (HCC)    • Hyperlipidemia    • Injury of kidney    • Irritable bowel disease    • Macular degeneration    • Macular disorder     degenerative disorder of macula   • Migraine     occipital migraine, has had neuologi evaluation   • Mixed hyperlipidemia    • Osteoarthritis     senile   • Osteoporosis    • Peripheral neuropathy    • Polyneuropathy    • Shingles    • Vitamin D deficiency       Past Surgical History:   Procedure Laterality Date   • APPENDECTOMY     • COLONOSCOPY     • HYSTERECTOMY     • SHOULDER SURGERY     • TONSILLECTOMY        Family History   Problem Relation Age of Onset   • Hypertension Mother    • Stroke Mother    • Hyperlipidemia Mother    • Alzheimer's disease Mother    • Heart failure Father    • Congenital heart disease Father    • Breast cancer Sister 57   • Aortic aneurysm Brother         abdominal   • Alcohol abuse Other         family history      Social History     Socioeconomic History   • Marital status:    Tobacco Use   • Smoking status: Former Smoker     Packs/day: 0.50     Start date:      Quit date:      Years since quittin.5   • Smokeless tobacco: Never Used   Vaping Use   • Vaping Use: Never used   Substance and Sexual Activity   • Alcohol use: Not Currently     Comment: stopped drinking in    • Drug use: Never   • Sexual activity: Defer     Review of Systems   Musculoskeletal: Positive for arthralgias.   Neurological: Positive for numbness.   All other  systems reviewed and are negative.      Objective:    /62   Pulse 70   Resp 16   Wt 62.1 kg (137 lb)   SpO2 100%   BMI 23.52 kg/m²     Neurology Exam: Reviewed and remains unchanged    General apperance: NAD.     Mental status: Alert, awake and oriented to time place and person.    Recent and Remote memory: Intact.    Attention span and Concentration: Normal.     Language and Speech: Intact- No dysarthria.    Fluency, Naming , Repitition and Comprehension:  Intact    Cranial Nerves:   CN II: Visual fields are full. Intact. Fundi - Normal, No papillederma, Pupils - PAZ  CN III, IV and VI: Extraocular movements are intact. Normal saccades.   CN V: Facial sensation is intact.   CN VII: Muscles of facial expression reveal no asymmetry. Intact.   CN VIII: Hearing is intact. Whispered voice intact.   CN IX and X: Palate elevates symmetrically. Intact  CN XI: Shoulder shrug is intact.   CN XII: Tongue is midline without evidence of atrophy or fasciculation.     Ophthalmoscopic exam of optic disc -deferred    Motor:  Right UE muscle strength 5/5. Normal tone.     Left UE muscle strength 5/5. Normal tone.      Right LE muscle strength5/5. Normal tone.     Left LE muscle strength 5/5. Normal tone.      Sensory: Mildly reduced to pinprick in both feet to level of ankles as well as slightly reduced vibration.  She had hyperesthesia to light touch in upper extremities    DTRs: 2+ bilaterally in upper and lower extremities.    Babinski: Negative bilaterally.    Co-ordination: Normal finger-to-nose, heel to shin B/L.    Rhomberg: Negative.    Gait: Normal.    Cardiovascular: Regular rate and rhythm without murmur, gallop or rub.    Assessment and Plan:    1. Polyneuropathy  She most likely has idiopathic neuropathy.  EMG/NCS showed mild neuropathy.    For her prediabetes she has been carrying out dietary modifications.  I have told her to speak to her PCP about rechecking an A1c level  She should continue taking  gabapentin but she can divide her dose and take 100 mg in the morning and 200 mg at night  She is taking B12 supplements.  Her last level was 502    2. Fibromyalgia  Continue gabapentin.      Return in about 9 months (around 3/28/2023).         Madonna Irene MD

## 2022-07-05 ENCOUNTER — OUTSIDE FACILITY SERVICE (OUTPATIENT)
Dept: GASTROENTEROLOGY | Facility: CLINIC | Age: 77
End: 2022-07-05

## 2022-07-05 PROCEDURE — 88305 TISSUE EXAM BY PATHOLOGIST: CPT | Performed by: INTERNAL MEDICINE

## 2022-07-05 PROCEDURE — 43239 EGD BIOPSY SINGLE/MULTIPLE: CPT | Performed by: INTERNAL MEDICINE

## 2022-07-05 PROCEDURE — 43249 ESOPH EGD DILATION <30 MM: CPT | Performed by: INTERNAL MEDICINE

## 2022-07-05 PROCEDURE — 88313 SPECIAL STAINS GROUP 2: CPT | Performed by: INTERNAL MEDICINE

## 2022-07-06 ENCOUNTER — LAB REQUISITION (OUTPATIENT)
Dept: LAB | Facility: HOSPITAL | Age: 77
End: 2022-07-06

## 2022-07-06 DIAGNOSIS — R13.10 DYSPHAGIA, UNSPECIFIED: ICD-10-CM

## 2022-07-06 DIAGNOSIS — R11.0 NAUSEA: ICD-10-CM

## 2022-07-06 DIAGNOSIS — R63.4 ABNORMAL WEIGHT LOSS: ICD-10-CM

## 2022-07-08 LAB
CYTO UR: NORMAL
LAB AP CASE REPORT: NORMAL
LAB AP CLINICAL INFORMATION: NORMAL
LAB AP DIAGNOSIS COMMENT: NORMAL
LAB AP SPECIAL STAINS: NORMAL
PATH REPORT.FINAL DX SPEC: NORMAL
PATH REPORT.GROSS SPEC: NORMAL

## 2022-10-25 DIAGNOSIS — G62.9 POLYNEUROPATHY: ICD-10-CM

## 2022-10-26 RX ORDER — GABAPENTIN 100 MG/1
CAPSULE ORAL
Qty: 90 CAPSULE | Refills: 3 | Status: SHIPPED | OUTPATIENT
Start: 2022-10-26 | End: 2023-02-23 | Stop reason: SDUPTHER

## 2022-12-16 ENCOUNTER — OFFICE VISIT (OUTPATIENT)
Dept: FAMILY MEDICINE CLINIC | Facility: CLINIC | Age: 77
End: 2022-12-16

## 2022-12-16 VITALS
WEIGHT: 139 LBS | SYSTOLIC BLOOD PRESSURE: 132 MMHG | DIASTOLIC BLOOD PRESSURE: 82 MMHG | BODY MASS INDEX: 23.73 KG/M2 | HEIGHT: 64 IN

## 2022-12-16 DIAGNOSIS — J01.00 ACUTE NON-RECURRENT MAXILLARY SINUSITIS: Primary | ICD-10-CM

## 2022-12-16 PROCEDURE — 99213 OFFICE O/P EST LOW 20 MIN: CPT | Performed by: STUDENT IN AN ORGANIZED HEALTH CARE EDUCATION/TRAINING PROGRAM

## 2022-12-16 RX ORDER — CEFDINIR 300 MG/1
300 CAPSULE ORAL 2 TIMES DAILY
Qty: 14 CAPSULE | Refills: 0 | Status: SHIPPED | OUTPATIENT
Start: 2022-12-16 | End: 2022-12-21

## 2022-12-16 NOTE — PROGRESS NOTES
"Chief Complaint  URI (Sick x11 days)    Yolanda De La Fuente presents to [facility]  URI   This is a new problem. The current episode started 1 to 4 weeks ago (11 days). The problem has been gradually worsening. Associated symptoms include congestion, coughing, headaches, joint pain, nausea, sinus pain, sneezing, a sore throat and vomiting. She has tried nothing for the symptoms.     She state that she has tried some mucinex without improvement.     She has not had her flu vaccine. She has had the original covid 19 vaccines, but no boosters.     She has no known exposure to COVID 19 or Flu    Objective   Vital Signs:   /82 (BP Location: Left arm, Patient Position: Sitting, Cuff Size: Adult)   Ht 162.6 cm (64\")   Wt 63 kg (139 lb)   BMI 23.86 kg/m²     Body mass index is 23.86 kg/m².    Review of Systems   HENT: Positive for congestion, sneezing and sore throat.    Respiratory: Positive for cough.    Gastrointestinal: Positive for nausea and vomiting.   Musculoskeletal: Positive for joint pain.       Past History:  Medical History: has a past medical history of Allergic rhinitis due to pollen, Atypical facial pain, Chronic vertigo, Coxarthrosis, Diverticulosis, Fibromyalgia, Gastroesophageal reflux disease without esophagitis, GERD (gastroesophageal reflux disease), History of drug dependence (HCC), Hyperlipidemia, Injury of kidney, Irritable bowel disease, Macular degeneration, Macular disorder, Migraine, Mixed hyperlipidemia, Osteoarthritis, Osteoporosis, Peripheral neuropathy, Polyneuropathy, Shingles, and Vitamin D deficiency.   Surgical History: has a past surgical history that includes Hysterectomy; Shoulder surgery; Tonsillectomy; Appendectomy; and Colonoscopy (2016).   Family History: family history includes Alcohol abuse in an other family member; Alzheimer's disease in her mother; Aortic aneurysm in her brother; Breast cancer (age of onset: 57) in her " sister; Congenital heart disease in her father; Heart failure in her father; Hyperlipidemia in her mother; Hypertension in her mother; Stroke in her mother.   Social History: reports that she quit smoking about 30 years ago. She started smoking about 57 years ago. She smoked an average of .5 packs per day. She has never used smokeless tobacco. She reports that she does not currently use alcohol. She reports that she does not use drugs.      Current Outpatient Medications:   •  acetaminophen (TYLENOL) 500 MG tablet, Take 400 mg by mouth Every 6 (Six) Hours As Needed for Mild Pain ., Disp: , Rfl:   •  alendronate (FOSAMAX) 70 MG tablet, take 1 po q week with glass of water, then NPO x 30min and must remain upright for 30min, Disp: , Rfl:   •  Calcium Carbonate-Vitamin D (calcium-vitamin D) 500-200 MG-UNIT tablet per tablet, , Disp: , Rfl:   •  cefdinir (OMNICEF) 300 MG capsule, Take 1 capsule by mouth 2 (Two) Times a Day., Disp: 14 capsule, Rfl: 0  •  Cholecalciferol (VITAMIN D3) 125 MCG (5000 UT) capsule capsule, Take 5,000 Units by mouth Daily., Disp: , Rfl:   •  cyanocobalamin (VITAMIN B-12) 1000 MCG tablet, , Disp: , Rfl:   •  gabapentin (NEURONTIN) 100 MG capsule, TAKE 3 CAPSULES BY MOUTH AT NIGHT, Disp: 90 capsule, Rfl: 3  •  guaiFENesin (MUCINEX) 600 MG 12 hr tablet, Take 600 mg by mouth Daily As Needed., Disp: , Rfl:   •  Multiple Vitamins-Minerals (HAIR SKIN & NAILS ADVANCED PO), Take  by mouth., Disp: , Rfl:   •  Multiple Vitamins-Minerals (PRESERVISION AREDS 2 PO), take one tablet po qd, Disp: , Rfl:   •  multivitamin (THERAGRAN) tablet tablet, Take 1 tablet by mouth Daily., Disp: , Rfl:   •  Omega-3 Fatty Acids (FISH OIL) 1000 MG capsule capsule, Take  by mouth Daily With Breakfast., Disp: , Rfl:   •  omeprazole (priLOSEC) 40 MG capsule, Take 40 mg by mouth Daily., Disp: , Rfl:   •  Probiotic Product (UP4 PROBIOTICS ADULT PO), Take  by mouth., Disp: , Rfl:     Allergies: Codeine, Erythromycin,  Clindamycin/lincomycin, Sulfa antibiotics, and Penicillins    Physical Exam  Constitutional:       General: She is not in acute distress.     Appearance: She is not ill-appearing or toxic-appearing.   HENT:      Head: Normocephalic and atraumatic.      Right Ear: Ear canal and external ear normal.      Left Ear: Ear canal and external ear normal.      Nose: Congestion and rhinorrhea present.      Mouth/Throat:      Pharynx: Posterior oropharyngeal erythema (cobblestoning) present.   Eyes:      General: No scleral icterus.  Cardiovascular:      Rate and Rhythm: Normal rate and regular rhythm.   Pulmonary:      Effort: Pulmonary effort is normal.      Breath sounds: Normal breath sounds.   Neurological:      Mental Status: She is alert.          Result Review :                   Assessment and Plan    Diagnoses and all orders for this visit:    1. Acute non-recurrent maxillary sinusitis (Primary)    Other orders  -     cefdinir (OMNICEF) 300 MG capsule; Take 1 capsule by mouth 2 (Two) Times a Day.  Dispense: 14 capsule; Refill: 0    Will treat with Cefdinir. Tylenol/Motrin for pain/fever. OTC cough and cold medication for symptoms. Nasal saline spray recommended. Cool mist humidifier at the bedside. RTC with new or worsening symptoms.      Follow Up   No follow-ups on file.  Patient was given instructions and counseling regarding her condition or for health maintenance advice. Please see specific information pulled into the AVS if appropriate.     Lidia Morales, DO

## 2022-12-20 ENCOUNTER — TELEPHONE (OUTPATIENT)
Dept: FAMILY MEDICINE CLINIC | Facility: CLINIC | Age: 77
End: 2022-12-20

## 2022-12-20 NOTE — TELEPHONE ENCOUNTER
Andrew is out this week. If she is still sick, she needs to be seen again, here or maybe at urgent care.

## 2022-12-20 NOTE — TELEPHONE ENCOUNTER
PATIENT SAW DR KIRK LAST WEEK AND WAS TOLD TO CALL BACK IF MEDICATION DID NOT WORK.  IT HAS NOT.  STILL COUGH, SINUS ISSUES.  WHAT TO DO?    PLEASE CALL [phone]

## 2022-12-21 ENCOUNTER — OFFICE VISIT (OUTPATIENT)
Dept: FAMILY MEDICINE CLINIC | Facility: CLINIC | Age: 77
End: 2022-12-21

## 2022-12-21 VITALS
DIASTOLIC BLOOD PRESSURE: 76 MMHG | OXYGEN SATURATION: 100 % | HEIGHT: 64 IN | SYSTOLIC BLOOD PRESSURE: 128 MMHG | HEART RATE: 93 BPM | BODY MASS INDEX: 23.9 KG/M2 | WEIGHT: 140 LBS

## 2022-12-21 DIAGNOSIS — J01.00 ACUTE NON-RECURRENT MAXILLARY SINUSITIS: Primary | ICD-10-CM

## 2022-12-21 DIAGNOSIS — J30.1 NON-SEASONAL ALLERGIC RHINITIS DUE TO POLLEN: ICD-10-CM

## 2022-12-21 PROCEDURE — 99213 OFFICE O/P EST LOW 20 MIN: CPT | Performed by: PHYSICIAN ASSISTANT

## 2022-12-21 RX ORDER — PREDNISONE 20 MG/1
TABLET ORAL
Qty: 9 TABLET | Refills: 0 | Status: SHIPPED | OUTPATIENT
Start: 2022-12-21 | End: 2023-01-04

## 2022-12-21 RX ORDER — CEPHALEXIN 500 MG/1
500 CAPSULE ORAL 2 TIMES DAILY
Qty: 14 CAPSULE | Refills: 0 | Status: SHIPPED | OUTPATIENT
Start: 2022-12-21 | End: 2022-12-28

## 2022-12-21 NOTE — PROGRESS NOTES
"Chief Complaint  URI (Still sick. Has quit taking cefdinir. Now just taking sudafed and mucinex.)    Subjective        Millie De La Fuente presents to [facility]  History of Present Illness  States that she was seen in our office on December 16 and diagnosed with sinus infection and placed on antibiotics.  She states that since taking antibiotics her symptoms have not improved.  She states she quit taking her antibiotics 3 days ago as they were not helping her.  She states that she has sinus infections pretty regularly and usually requires 2 rounds of antibiotics as well as prednisone to help get rid of the symptoms.  She states she continues to have sinus pressure and pain.  She continues to have cough and postnasal drainage.  She is taking both Sudafed and Mucinex D and had hard time sleeping last night.       Objective   Vital Signs:  /76 (BP Location: Left arm, Patient Position: Sitting, Cuff Size: Adult)   Pulse 93   Ht 162.6 cm (64\")   Wt 63.5 kg (140 lb)   SpO2 100%   BMI 24.03 kg/m²   Estimated body mass index is 24.03 kg/m² as calculated from the following:    Height as of this encounter: 162.6 cm (64\").    Weight as of this encounter: 63.5 kg (140 lb).    BMI is within normal parameters. No other follow-up for BMI required.      Physical Exam  Vitals and nursing note reviewed.   Constitutional:       Appearance: Normal appearance. She is normal weight.   HENT:      Head: Normocephalic and atraumatic.      Right Ear: Tympanic membrane, ear canal and external ear normal.      Left Ear: Tympanic membrane, ear canal and external ear normal.      Nose: Congestion present.      Mouth/Throat:      Mouth: Mucous membranes are moist.      Comments: Copious postnasal drainage  Eyes:      Pupils: Pupils are equal, round, and reactive to light.   Cardiovascular:      Rate and Rhythm: Normal rate and regular rhythm.      Heart sounds: Normal heart sounds.   Pulmonary:      Effort: " Pulmonary effort is normal.      Breath sounds: Normal breath sounds.   Neurological:      General: No focal deficit present.      Mental Status: She is alert.   Psychiatric:         Mood and Affect: Mood normal.        Result Review :       Office Visit with Lidia Morales DO (12/16/2022)           Assessment and Plan   Diagnoses and all orders for this visit:    1. Acute non-recurrent maxillary sinusitis (Primary)  -     cephalexin (Keflex) 500 MG capsule; Take 1 capsule by mouth 2 (Two) Times a Day for 7 days.  Dispense: 14 capsule; Refill: 0  -     predniSONE (DELTASONE) 20 MG tablet; 2 tab po qd x 3 days then 1 tab po qd 3 days  Dispense: 9 tablet; Refill: 0  Will add Keflex as well as prednisone to help decrease her symptoms.  We encouraged her allergy medications daily as well.  2. Non-seasonal allergic rhinitis due to pollen  See treatment above also encouraged allergy medications daily.           Follow Up   No follow-ups on file.  Patient was given instructions and counseling regarding her condition or for health maintenance advice. Please see specific information pulled into the AVS if appropriate.

## 2023-01-04 ENCOUNTER — OFFICE VISIT (OUTPATIENT)
Dept: FAMILY MEDICINE CLINIC | Facility: CLINIC | Age: 78
End: 2023-01-04
Payer: MEDICARE

## 2023-01-04 VITALS
DIASTOLIC BLOOD PRESSURE: 62 MMHG | HEIGHT: 64 IN | BODY MASS INDEX: 23.56 KG/M2 | OXYGEN SATURATION: 99 % | WEIGHT: 138 LBS | SYSTOLIC BLOOD PRESSURE: 118 MMHG | HEART RATE: 85 BPM | TEMPERATURE: 98.1 F

## 2023-01-04 DIAGNOSIS — J01.00 ACUTE NON-RECURRENT MAXILLARY SINUSITIS: Primary | ICD-10-CM

## 2023-01-04 LAB
EXPIRATION DATE: NORMAL
INTERNAL CONTROL: NORMAL
Lab: NORMAL
S PYO AG THROAT QL: NEGATIVE

## 2023-01-04 PROCEDURE — 99213 OFFICE O/P EST LOW 20 MIN: CPT | Performed by: NURSE PRACTITIONER

## 2023-01-04 PROCEDURE — 87880 STREP A ASSAY W/OPTIC: CPT | Performed by: NURSE PRACTITIONER

## 2023-01-04 RX ORDER — DOXYCYCLINE HYCLATE 100 MG/1
100 CAPSULE ORAL 2 TIMES DAILY
Qty: 20 CAPSULE | Refills: 0 | Status: SHIPPED | OUTPATIENT
Start: 2023-01-04 | End: 2023-03-01

## 2023-01-04 RX ORDER — PREDNISONE 20 MG/1
TABLET ORAL
Qty: 10 TABLET | Refills: 0 | Status: SHIPPED | OUTPATIENT
Start: 2023-01-04 | End: 2023-03-01

## 2023-01-04 NOTE — ASSESSMENT & PLAN NOTE
Antibiotic as prescribed.  We will try another course of prednisone.  Avoid NSAIDs.  Tylenol as needed for pain fever.  Fluids and rest encouraged.  Risk of meds discussed understood.  Strep test negative today in clinic.  Culture sent.  Call in 2 days for results.  Return to clinic in 2 days if no improvement, sooner if worsens.  Return to clinic or ED with any issues or concerns.

## 2023-01-04 NOTE — PROGRESS NOTES
Chief Complaint  URI (Cough/Sore throat)    Subjective          Millie De La Fuente presents to [facility]  History of Present Illness     Patient states she continues to mckeon a sinus infection for the past 3 weeks.  She saw Dr. Morales on 12/16/2022 and was prescribed cefdinir but states that did nothing so she came back and saw Adore Santy and was prescribed cephalexin and prednisone.  States this did help but it never fully went away.  States she needs a little bit more to get over the hump to clear fully.  States she does have a mild nonproductive cough at times states the main issue is sinus pressure and congestion.  States her ears do feel full at times. Sore/scratchy throat at times. She does have postnasal drip and plans on starting back on an allergy medicine.  No fever no chills no body aches.  No chest pain no chest pressure no shortness of breath no trouble breathing no urinary or bowel issues.    Objective   Vital Signs:   /62   Pulse 85   Temp 98.1 °F (36.7 °C)   Ht 162.6 cm (64\")   Wt 62.6 kg (138 lb)   SpO2 99%   BMI 23.69 kg/m²     Body mass index is 23.69 kg/m².    Review of Systems   Constitutional: Negative for chills, fatigue and fever.   HENT: Positive for congestion, postnasal drip, sinus pressure and sore throat. Negative for sneezing, swollen glands and trouble swallowing.    Respiratory: Positive for cough. Negative for shortness of breath and wheezing.    Cardiovascular: Negative for chest pain.   Gastrointestinal: Negative for abdominal pain, diarrhea, nausea and vomiting.   Genitourinary: Negative for dysuria.   Musculoskeletal: Negative for back pain.   Neurological: Negative for headache.       Past History:  Medical History: has a past medical history of Allergic rhinitis due to pollen, Atypical facial pain, Chronic vertigo, Coxarthrosis, Diverticulosis, Fibromyalgia, Gastroesophageal reflux disease without esophagitis, GERD (gastroesophageal  reflux disease), History of drug dependence (HCC), Hyperlipidemia, Injury of kidney, Irritable bowel disease, Macular degeneration, Macular disorder, Migraine, Mixed hyperlipidemia, Osteoarthritis, Osteoporosis, Peripheral neuropathy, Polyneuropathy, Shingles, and Vitamin D deficiency.   Surgical History: has a past surgical history that includes Hysterectomy; Shoulder surgery; Tonsillectomy; Appendectomy; and Colonoscopy (2016).   Family History: family history includes Alcohol abuse in an other family member; Alzheimer's disease in her mother; Aortic aneurysm in her brother; Breast cancer (age of onset: 57) in her sister; Congenital heart disease in her father; Heart failure in her father; Hyperlipidemia in her mother; Hypertension in her mother; Stroke in her mother.   Social History: reports that she quit smoking about 31 years ago. Her smoking use included cigarettes. She started smoking about 58 years ago. She smoked an average of .5 packs per day. She has never used smokeless tobacco. She reports that she does not currently use alcohol. She reports that she does not use drugs.    PHQ-2 Depression Screening  Little interest or pleasure in doing things? 0-->not at all   Feeling down, depressed, or hopeless? 0-->not at all   PHQ-2 Total Score 0        PHQ-9 Depression Screening  Little interest or pleasure in doing things? 0-->not at all   Feeling down, depressed, or hopeless? 0-->not at all   Trouble falling or staying asleep, or sleeping too much?     Feeling tired or having little energy?     Poor appetite or overeating?     Feeling bad about yourself - or that you are a failure or have let yourself or your family down?     Trouble concentrating on things, such as reading the newspaper or watching television?     Moving or speaking so slowly that other people could have noticed? Or the opposite - being so fidgety or restless that you have been moving around a lot more than usual?     Thoughts that you would  be better off dead, or of hurting yourself in some way?     PHQ-9 Total Score 0   If you checked off any problems, how difficult have these problems made it for you to do your work, take care of things at home, or get along with other people?       PHQ-9 Total Score: 0      Patient screened positive for depression based on a PHQ-9 score of 0 on 1/4/2023. Follow-up recommendations include:          Current Outpatient Medications:   •  acetaminophen (TYLENOL) 500 MG tablet, Take 400 mg by mouth Every 6 (Six) Hours As Needed for Mild Pain ., Disp: , Rfl:   •  alendronate (FOSAMAX) 70 MG tablet, take 1 po q week with glass of water, then NPO x 30min and must remain upright for 30min, Disp: , Rfl:   •  Calcium Carbonate-Vitamin D (calcium-vitamin D) 500-200 MG-UNIT tablet per tablet, , Disp: , Rfl:   •  Cholecalciferol (VITAMIN D3) 125 MCG (5000 UT) capsule capsule, Take 5,000 Units by mouth Daily., Disp: , Rfl:   •  cyanocobalamin (VITAMIN B-12) 1000 MCG tablet, , Disp: , Rfl:   •  gabapentin (NEURONTIN) 100 MG capsule, TAKE 3 CAPSULES BY MOUTH AT NIGHT, Disp: 90 capsule, Rfl: 3  •  Multiple Vitamins-Minerals (HAIR SKIN & NAILS ADVANCED PO), Take  by mouth., Disp: , Rfl:   •  Multiple Vitamins-Minerals (PRESERVISION AREDS 2 PO), take one tablet po qd, Disp: , Rfl:   •  multivitamin (THERAGRAN) tablet tablet, Take 1 tablet by mouth Daily., Disp: , Rfl:   •  Omega-3 Fatty Acids (FISH OIL) 1000 MG capsule capsule, Take  by mouth Daily With Breakfast., Disp: , Rfl:   •  omeprazole (priLOSEC) 40 MG capsule, Take 40 mg by mouth Daily., Disp: , Rfl:   •  Probiotic Product (UP4 PROBIOTICS ADULT PO), Take  by mouth., Disp: , Rfl:   •  doxycycline (VIBRAMYCIN) 100 MG capsule, Take 1 capsule by mouth 2 (Two) Times a Day., Disp: 20 capsule, Rfl: 0  •  guaiFENesin (MUCINEX) 600 MG 12 hr tablet, Take 600 mg by mouth Daily As Needed., Disp: , Rfl:   •  predniSONE (DELTASONE) 20 MG tablet, Take 2 tabs Po qd x 3 days then 1 tab PO qd x  3 days then 0.5 tab PO qd x 2 days, Disp: 10 tablet, Rfl: 0   (Not in a hospital admission)     Allergies: Codeine, Erythromycin, Clindamycin/lincomycin, Sulfa antibiotics, and Penicillins    Physical Exam  Constitutional:       Appearance: Normal appearance.   HENT:      Right Ear: Tympanic membrane, ear canal and external ear normal.      Left Ear: Tympanic membrane, ear canal and external ear normal.      Nose: Congestion present.      Comments: Bilateral maxillary sinuses tender to palpation.     Mouth/Throat:      Mouth: Mucous membranes are moist.      Pharynx: Oropharynx is clear.   Cardiovascular:      Rate and Rhythm: Normal rate and regular rhythm.      Heart sounds: Normal heart sounds.   Pulmonary:      Effort: Pulmonary effort is normal. No respiratory distress.      Breath sounds: Normal breath sounds. No wheezing, rhonchi or rales.   Neurological:      General: No focal deficit present.      Mental Status: She is alert and oriented to person, place, and time. Mental status is at baseline.   Psychiatric:         Mood and Affect: Mood normal.         Behavior: Behavior normal.         Thought Content: Thought content normal.         Judgment: Judgment normal.          Result Review :                   Assessment and Plan    Diagnoses and all orders for this visit:    1. Acute non-recurrent maxillary sinusitis (Primary)  Assessment & Plan:  Antibiotic as prescribed.  We will try another course of prednisone.  Avoid NSAIDs.  Tylenol as needed for pain fever.  Fluids and rest encouraged.  Risk of meds discussed understood.  Strep test negative today in clinic.  Culture sent.  Call in 2 days for results.  Return to clinic in 2 days if no improvement, sooner if worsens.  Return to clinic or ED with any issues or concerns.    Orders:  -     predniSONE (DELTASONE) 20 MG tablet; Take 2 tabs Po qd x 3 days then 1 tab PO qd x 3 days then 0.5 tab PO qd x 2 days  Dispense: 10 tablet; Refill: 0  -     doxycycline  (VIBRAMYCIN) 100 MG capsule; Take 1 capsule by mouth 2 (Two) Times a Day.  Dispense: 20 capsule; Refill: 0  -     POC Rapid Strep A; Future            BMI is within normal parameters. No other follow-up for BMI required.       Follow Up   Return if symptoms worsen or fail to improve.  Patient was given instructions and counseling regarding her condition or for health maintenance advice. Please see specific information pulled into the AVS if appropriate.     OLEG Holt

## 2023-01-07 LAB
BACTERIA SPEC RESP CULT: NORMAL
BACTERIA SPEC RESP CULT: NORMAL

## 2023-01-07 RX ORDER — OMEPRAZOLE 40 MG/1
CAPSULE, DELAYED RELEASE ORAL
Qty: 90 CAPSULE | Refills: 0 | Status: SHIPPED | OUTPATIENT
Start: 2023-01-07

## 2023-02-23 DIAGNOSIS — G62.9 POLYNEUROPATHY: ICD-10-CM

## 2023-02-23 RX ORDER — GABAPENTIN 100 MG/1
CAPSULE ORAL
Qty: 90 CAPSULE | Refills: 3 | Status: SHIPPED | OUTPATIENT
Start: 2023-02-23

## 2023-02-23 NOTE — TELEPHONE ENCOUNTER
Rx Refill Note  Requested Prescriptions     Pending Prescriptions Disp Refills   • gabapentin (NEURONTIN) 100 MG capsule 90 capsule 3     Sig: TAKE 3 CAPSULES BY MOUTH AT NIGHT      Last filled:10/26/22 with 3 refills. Pending to provider  Last office visit with prescribing clinician: 6/28/2022      Next office visit with prescribing clinician: 6/26/2023     Tyler Garcia MA  02/23/23, 16:29 EST

## 2023-02-23 NOTE — TELEPHONE ENCOUNTER
Caller: Millie De La Fuente    Relationship: Self    Best call back number: [phone]    Requested Prescriptions:   Requested Prescriptions     Pending Prescriptions Disp Refills   • gabapentin (NEURONTIN) 100 MG capsule 90 capsule 3     Sig: TAKE 3 CAPSULES BY MOUTH AT NIGHT        Pharmacy where request should be sent: [facility] [address] [place] [address] [phone] [facility] [phone] FX     Additional details provided by patient: PT NEEDS A REFILL ON HER GABAPENTIN      Does the patient have less than a 3 day supply:  [x] Yes  [] No    Would you like a call back once the refill request has been completed: [] Yes [x] No    If the office needs to give you a call back, can they leave a voicemail: [] Yes [x] No    Rose Marie Barber Rep   02/23/23 12:09 EST

## 2023-03-01 ENCOUNTER — OFFICE VISIT (OUTPATIENT)
Dept: FAMILY MEDICINE CLINIC | Facility: CLINIC | Age: 78
End: 2023-03-01
Payer: MEDICARE

## 2023-03-01 VITALS
OXYGEN SATURATION: 98 % | HEIGHT: 64 IN | SYSTOLIC BLOOD PRESSURE: 122 MMHG | HEART RATE: 91 BPM | TEMPERATURE: 97.6 F | BODY MASS INDEX: 24.07 KG/M2 | WEIGHT: 141 LBS | DIASTOLIC BLOOD PRESSURE: 70 MMHG

## 2023-03-01 DIAGNOSIS — J30.9 ALLERGIC RHINITIS, UNSPECIFIED SEASONALITY, UNSPECIFIED TRIGGER: ICD-10-CM

## 2023-03-01 DIAGNOSIS — J01.00 ACUTE NON-RECURRENT MAXILLARY SINUSITIS: ICD-10-CM

## 2023-03-01 PROCEDURE — 99214 OFFICE O/P EST MOD 30 MIN: CPT | Performed by: PHYSICIAN ASSISTANT

## 2023-03-01 RX ORDER — CEFDINIR 300 MG/1
300 CAPSULE ORAL 2 TIMES DAILY
Qty: 14 CAPSULE | Refills: 0 | Status: SHIPPED | OUTPATIENT
Start: 2023-03-01 | End: 2023-03-08

## 2023-03-01 NOTE — PROGRESS NOTES
"Chief Complaint  Nasal Congestion (Nasal congestion and facial pain started Monday )    Subjective          Millie De La Fuente presents to [facility]  History of Present Illness  Patient in  today for evaluation on nasal congestion and sinus pressure that started about 4 days ago.  She states she has had recurrent issues with sinus symptoms had difficulty getting over the last year.  She feels like there may be an allergy component and would like a referral to an allergist for evaluation.  She denies any fever.  Denies any nausea, vomiting, or diarrhea.      Objective   Vital Signs:   /70 (BP Location: Left arm, Patient Position: Sitting, Cuff Size: Adult)   Pulse 91   Temp 97.6 °F (36.4 °C)   Ht 162.6 cm (64\")   Wt 64 kg (141 lb)   SpO2 98%   BMI 24.20 kg/m²     Body mass index is 24.2 kg/m².    Review of Systems   Constitutional: Negative for fatigue.   HENT: Positive for congestion and sore throat.    Respiratory: Negative for cough and shortness of breath.    Cardiovascular: Negative for chest pain.   Gastrointestinal: Negative for abdominal pain, diarrhea, nausea and vomiting.   Neurological: Negative for dizziness and headache.       Past History:  Medical History: has a past medical history of Allergic rhinitis due to pollen, Atypical facial pain, Chronic vertigo, Coxarthrosis, Diverticulosis, Fibromyalgia, Gastroesophageal reflux disease without esophagitis, GERD (gastroesophageal reflux disease), History of drug dependence (HCC), Hyperlipidemia, Injury of kidney, Irritable bowel disease, Macular degeneration, Macular disorder, Migraine, Mixed hyperlipidemia, Osteoarthritis, Osteoporosis, Peripheral neuropathy, Polyneuropathy, Shingles, and Vitamin D deficiency.   Surgical History: has a past surgical history that includes Hysterectomy; Shoulder surgery; Tonsillectomy; Appendectomy; and Colonoscopy (2016).   Family History: family history includes Alcohol abuse in an other " family member; Alzheimer's disease in her mother; Aortic aneurysm in her brother; Breast cancer (age of onset: 57) in her sister; Congenital heart disease in her father; Heart failure in her father; Hyperlipidemia in her mother; Hypertension in her mother; Stroke in her mother.   Social History: reports that she quit smoking about 31 years ago. Her smoking use included cigarettes. She started smoking about 58 years ago. She smoked an average of .5 packs per day. She has never used smokeless tobacco. She reports that she does not currently use alcohol. She reports that she does not use drugs.      Current Outpatient Medications:   •  acetaminophen (TYLENOL) 500 MG tablet, Take 400 mg by mouth Every 6 (Six) Hours As Needed for Mild Pain ., Disp: , Rfl:   •  alendronate (FOSAMAX) 70 MG tablet, take 1 po q week with glass of water, then NPO x 30min and must remain upright for 30min, Disp: , Rfl:   •  Calcium Carbonate-Vitamin D (calcium-vitamin D) 500-200 MG-UNIT tablet per tablet, , Disp: , Rfl:   •  Cholecalciferol (VITAMIN D3) 125 MCG (5000 UT) capsule capsule, Take 1 capsule by mouth Daily., Disp: , Rfl:   •  cyanocobalamin (VITAMIN B-12) 1000 MCG tablet, , Disp: , Rfl:   •  gabapentin (NEURONTIN) 100 MG capsule, TAKE 3 CAPSULES BY MOUTH AT NIGHT, Disp: 90 capsule, Rfl: 3  •  Multiple Vitamins-Minerals (HAIR SKIN & NAILS ADVANCED PO), Take  by mouth., Disp: , Rfl:   •  Multiple Vitamins-Minerals (PRESERVISION AREDS 2 PO), take one tablet po qd, Disp: , Rfl:   •  multivitamin (THERAGRAN) tablet tablet, Take 1 tablet by mouth Daily., Disp: , Rfl:   •  Omega-3 Fatty Acids (FISH OIL) 1000 MG capsule capsule, Take  by mouth Daily With Breakfast., Disp: , Rfl:   •  omeprazole (priLOSEC) 40 MG capsule, TAKE 1 CAPSULE BY MOUTH ONCE DAILY BEFORE  A  MEAL, Disp: 90 capsule, Rfl: 0  •  cefdinir (OMNICEF) 300 MG capsule, Take 1 capsule by mouth 2 (Two) Times a Day for 7 days., Disp: 14 capsule, Rfl: 0  •  Probiotic Product (UP4  PROBIOTICS ADULT PO), Take  by mouth., Disp: , Rfl:   Allergies: Codeine, Erythromycin, Clindamycin/lincomycin, Sulfa antibiotics, and Penicillins    Physical Exam  Constitutional:       Appearance: Normal appearance.   HENT:      Right Ear: Tympanic membrane normal.      Left Ear: Tympanic membrane normal.      Mouth/Throat:      Mouth: Mucous membranes are moist.   Eyes:      Pupils: Pupils are equal, round, and reactive to light.   Cardiovascular:      Rate and Rhythm: Normal rate and regular rhythm.      Heart sounds: Normal heart sounds.   Pulmonary:      Effort: Pulmonary effort is normal.      Breath sounds: Normal breath sounds.   Neurological:      Mental Status: She is alert and oriented to person, place, and time.   Psychiatric:         Mood and Affect: Mood normal.         Behavior: Behavior normal.             Assessment and Plan   Diagnoses and all orders for this visit:    1. Acute non-recurrent maxillary sinusitis  Will send in cefdinir for sinus symptoms. RTC if not improving.   2. Allergic rhinitis, unspecified seasonality, unspecified trigger  -     Ambulatory Referral to Allergy  Will put in referral for allergist- she does not take antihistamines as dry out her eyes too much.   Other orders  -     cefdinir (OMNICEF) 300 MG capsule; Take 1 capsule by mouth 2 (Two) Times a Day for 7 days.  Dispense: 14 capsule; Refill: 0            Follow Up   No follow-ups on file.  Patient was given instructions and counseling regarding her condition or for health maintenance advice. Please see specific information pulled into the AVS if appropriate.     Sabina Henderson PA-C

## 2023-03-24 ENCOUNTER — OFFICE VISIT (OUTPATIENT)
Dept: FAMILY MEDICINE CLINIC | Facility: CLINIC | Age: 78
End: 2023-03-24
Payer: MEDICARE

## 2023-03-24 VITALS
WEIGHT: 141 LBS | HEIGHT: 64 IN | SYSTOLIC BLOOD PRESSURE: 116 MMHG | DIASTOLIC BLOOD PRESSURE: 68 MMHG | HEART RATE: 84 BPM | TEMPERATURE: 98.2 F | BODY MASS INDEX: 24.07 KG/M2 | OXYGEN SATURATION: 100 % | RESPIRATION RATE: 17 BRPM

## 2023-03-24 DIAGNOSIS — K21.9 GASTROESOPHAGEAL REFLUX DISEASE WITHOUT ESOPHAGITIS: ICD-10-CM

## 2023-03-24 DIAGNOSIS — G62.9 POLYNEUROPATHY: ICD-10-CM

## 2023-03-24 DIAGNOSIS — M79.7 FIBROMYALGIA: ICD-10-CM

## 2023-03-24 DIAGNOSIS — R53.83 OTHER FATIGUE: ICD-10-CM

## 2023-03-24 DIAGNOSIS — R73.9 BLOOD GLUCOSE ELEVATED: ICD-10-CM

## 2023-03-24 DIAGNOSIS — Z79.899 HIGH RISK MEDICATION USE: ICD-10-CM

## 2023-03-24 DIAGNOSIS — M16.0 PRIMARY OSTEOARTHRITIS OF BOTH HIPS: ICD-10-CM

## 2023-03-24 DIAGNOSIS — J30.1 NON-SEASONAL ALLERGIC RHINITIS DUE TO POLLEN: ICD-10-CM

## 2023-03-24 DIAGNOSIS — Z00.01 ENCOUNTER FOR GENERAL ADULT MEDICAL EXAMINATION WITH ABNORMAL FINDINGS: Primary | ICD-10-CM

## 2023-03-24 DIAGNOSIS — H35.30 MACULAR DEGENERATION, UNSPECIFIED LATERALITY, UNSPECIFIED TYPE: ICD-10-CM

## 2023-03-24 DIAGNOSIS — E55.9 VITAMIN D DEFICIENCY: ICD-10-CM

## 2023-03-24 DIAGNOSIS — M81.0 SENILE OSTEOPOROSIS: ICD-10-CM

## 2023-03-24 DIAGNOSIS — Z11.59 NEED FOR HEPATITIS C SCREENING TEST: ICD-10-CM

## 2023-03-24 DIAGNOSIS — E78.2 MIXED HYPERLIPIDEMIA: ICD-10-CM

## 2023-03-24 RX ORDER — CHIA/LINOLENIC/LINOLEIC/OLEIC 1000 MG
CAPSULE ORAL
COMMUNITY

## 2023-03-24 RX ORDER — OMEPRAZOLE 40 MG/1
1 CAPSULE, DELAYED RELEASE ORAL EVERY 24 HOURS
Qty: 30 CAPSULE | Refills: 24 | COMMUNITY
Start: 2021-11-03 | End: 2023-03-24 | Stop reason: SDUPTHER

## 2023-03-24 NOTE — PROGRESS NOTES
"Chief Complaint  Annual Exam (Lab work- cholesterol, Kidney function tests ) and Sinusitis (Sees an allergist next week.)    Subjective      Millie De La Fuente presents to [facility]  History of Present Illness  Patient here for annual exam.  She mentions that she had recurrent sinusitis since December, but has not really here to see me about this, it is just a point of information and has an appointment to see an allergist next week.  She has been feeling well.  Fibromyalgia symptoms are doing better and she has increased her activity and is walking about 2 miles a day.  Neuropathy symptoms are little bit better as well.  She is on Prolia for her osteoporosis now.  Objective   Vital Signs:   Vitals:    03/24/23 0811   BP: 116/68   BP Location: Left arm   Patient Position: Sitting   Cuff Size: Adult   Pulse: 84   Resp: 17   Temp: 98.2 °F (36.8 °C)   TempSrc: Oral   SpO2: 100%   Weight: 64 kg (141 lb)   Height: 162.6 cm (64.02\")      /68 (BP Location: Left arm, Patient Position: Sitting, Cuff Size: Adult)   Pulse 84   Temp 98.2 °F (36.8 °C) (Oral)   Resp 17   Ht 162.6 cm (64.02\")   Wt 64 kg (141 lb)   SpO2 100%   BMI 24.19 kg/m²     Body mass index is 24.19 kg/m².    Review of Systems   Constitutional: Negative for chills, diaphoresis, fever and unexpected weight loss.   HENT: Positive for postnasal drip, sinus pressure and sneezing. Negative for ear discharge, ear pain, mouth sores, nosebleeds, rhinorrhea, sore throat, swollen glands, trouble swallowing and voice change.    Eyes: Negative for blurred vision, double vision, pain, redness and visual disturbance.   Respiratory: Negative for cough, shortness of breath and wheezing.    Cardiovascular: Negative for chest pain, palpitations and leg swelling.        PND, orthopnea   Gastrointestinal: Negative for abdominal distention, abdominal pain, blood in stool, constipation, diarrhea, nausea, vomiting and GERD.        Dysphagia, " odynophagia   Endocrine: Negative for polydipsia, polyphagia and polyuria.   Genitourinary: Negative for difficulty urinating, dysuria, frequency, hematuria and urinary incontinence.   Musculoskeletal: Positive for arthralgias (unusual/atypica) and myalgias. Negative for back pain, gait problem, joint swelling and neck pain.   Skin: Negative for color change, rash, skin lesions (worrisome/suspicious) and bruise.   Allergic/Immunologic: Negative for food allergies.   Neurological: Positive for numbness. Negative for dizziness, tremors, seizures, syncope, weakness, light-headedness, headache and memory problem.   Hematological: Negative for adenopathy. Does not bruise/bleed easily.   Psychiatric/Behavioral: Negative for sleep disturbance, suicidal ideas and depressed mood. The patient is not nervous/anxious.        Past History:  Medical History: has a past medical history of Allergic rhinitis due to pollen, Atypical facial pain, Chronic vertigo, Coxarthrosis, Diverticulosis, Fibromyalgia, Gastroesophageal reflux disease without esophagitis, GERD (gastroesophageal reflux disease), History of drug dependence ([facility]), Hyperlipidemia, Injury of kidney, Irritable bowel disease, Macular degeneration, Macular disorder, Migraine, Mixed hyperlipidemia, Osteoarthritis, Osteoporosis, Peripheral neuropathy, Polyneuropathy, Shingles, and Vitamin D deficiency.   Surgical History: has a past surgical history that includes Hysterectomy; Shoulder surgery; Tonsillectomy; Appendectomy; and Colonoscopy (2016).   Family History: family history includes Alcohol abuse in an other family member; Alzheimer's disease in her mother; Aortic aneurysm in her brother; Breast cancer (age of onset: 57) in her sister; Congenital heart disease in her father; Heart failure in her father; Hyperlipidemia in her mother; Hypertension in her mother; Stroke in her mother.   Social History: reports that she quit smoking about 31 years ago. Her smoking use  included cigarettes. She started smoking about 58 years ago. She has a 13.50 pack-year smoking history. She has never used smokeless tobacco. She reports that she does not currently use alcohol. She reports that she does not use drugs.      Current Outpatient Medications:   •  acetaminophen (TYLENOL) 500 MG tablet, Take 400 mg by mouth Every 6 (Six) Hours As Needed for Mild Pain ., Disp: , Rfl:   •  Calcium Carbonate-Vitamin D (calcium-vitamin D) 500-200 MG-UNIT tablet per tablet, , Disp: , Rfl:   •  Jay Oil (Jay Seed Oil Extract) 1000 MG capsule, Take  by mouth., Disp: , Rfl:   •  Cholecalciferol (VITAMIN D3) 125 MCG (5000 UT) capsule capsule, Take 1 capsule by mouth Daily., Disp: , Rfl:   •  cyanocobalamin (VITAMIN B-12) 1000 MCG tablet, , Disp: , Rfl:   •  denosumab (PROLIA) 60 MG/ML solution prefilled syringe syringe, Inject  under the skin into the appropriate area as directed 1 (One) Time., Disp: , Rfl:   •  gabapentin (NEURONTIN) 100 MG capsule, TAKE 3 CAPSULES BY MOUTH AT NIGHT, Disp: 90 capsule, Rfl: 3  •  Multiple Vitamins-Minerals (HAIR SKIN & NAILS ADVANCED PO), Take  by mouth., Disp: , Rfl:   •  Multiple Vitamins-Minerals (PRESERVISION AREDS 2 PO), take one tablet po qd, Disp: , Rfl:   •  omeprazole (priLOSEC) 40 MG capsule, TAKE 1 CAPSULE BY MOUTH ONCE DAILY BEFORE  A  MEAL, Disp: 90 capsule, Rfl: 0    Allergies: Codeine, Erythromycin, Clindamycin/lincomycin, Sulfa antibiotics, and Penicillins    Physical Exam  Constitutional:       General: She is not in acute distress.     Appearance: Normal appearance. She is not toxic-appearing.   HENT:      Head: Normocephalic and atraumatic.      Right Ear: Ear canal and external ear normal.      Left Ear: Ear canal and external ear normal.      Nose: Nose normal.      Mouth/Throat:      Mouth: Mucous membranes are moist.      Pharynx: Oropharynx is clear.   Eyes:      General: No scleral icterus.     Extraocular Movements: Extraocular movements intact.       Conjunctiva/sclera: Conjunctivae normal.      Pupils: Pupils are equal, round, and reactive to light.   Neck:      Vascular: No carotid bruit.   Cardiovascular:      Rate and Rhythm: Normal rate and regular rhythm.      Pulses: Normal pulses.      Heart sounds: Normal heart sounds.   Pulmonary:      Effort: Pulmonary effort is normal.      Breath sounds: Normal breath sounds.   Chest:      Chest wall: No tenderness.   Abdominal:      General: Bowel sounds are normal. There is no distension.      Palpations: Abdomen is soft. There is no mass.      Tenderness: There is no abdominal tenderness. There is no guarding or rebound.   Musculoskeletal:         General: No swelling, tenderness or deformity. Normal range of motion.      Cervical back: Normal range of motion. No rigidity.      Right lower leg: No edema.      Left lower leg: No edema.   Lymphadenopathy:      Cervical: No cervical adenopathy.   Skin:     General: Skin is warm and dry.      Capillary Refill: Capillary refill takes less than 2 seconds.      Coloration: Skin is not pale.      Findings: No erythema or rash.   Neurological:      General: No focal deficit present.      Mental Status: She is alert and oriented to person, place, and time.      Cranial Nerves: No cranial nerve deficit.      Motor: No weakness.      Coordination: Coordination normal.      Gait: Gait normal.   Psychiatric:         Mood and Affect: Mood normal.         Behavior: Behavior normal.         Thought Content: Thought content normal.         Judgment: Judgment normal.                   Assessment and Plan   Diagnoses and all orders for this visit:    1. Encounter for general adult medical examination with abnormal findings (Primary)  Healthy lifestyle measures including healthy diet regular exercise were discussed, and patient is already doing those things and was praised for this.  Preventive healthcare measures were discussed.  Her care gaps do not show that she is overdue for a  mammogram, but is been almost 2 years since her last one, but she says she will call and get this scheduled right away.  She will see the allergist next week for her allergies and recurrent sinusitis issues, not currently having symptoms of infection today.  We will check labs and if all goes well I will see her back in a year or sooner if needed  2. Fibromyalgia    3. Non-seasonal allergic rhinitis due to pollen    4. Gastroesophageal reflux disease without esophagitis    5. High risk medication use  -     CBC & Differential; Future  -     Comprehensive Metabolic Panel; Future  -     CBC & Differential  -     Comprehensive Metabolic Panel    6. Mixed hyperlipidemia  -     Lipid Panel; Future  -     Lipid Panel    7. Polyneuropathy  -     Vitamin B12; Future  -     Folate; Future  -     Vitamin B12  -     Folate    8. Primary osteoarthritis of both hips    9. Senile osteoporosis    10. Vitamin D deficiency  -     Vitamin D,25-Hydroxy    11. Macular degeneration, unspecified laterality, unspecified type    12. Blood glucose elevated  -     Hemoglobin A1c; Future  -     Hemoglobin A1c  Had been mildly elevated at 1 time in the past so annual A1c is being done as a precaution  13. Other fatigue  -     TSH+Free T4; Future  -     TSH+Free T4  This is better with increase in activity  14. Need for hepatitis C screening test  -     Hepatitis C Antibody; Future  -     Hepatitis C Antibody            Follow Up   Return in about 1 year (around 3/24/2024) for Annual physical.  Patient was given instructions and counseling regarding her condition or for health maintenance advice. Please see specific information pulled into the AVS if appropriate.     Remi Morris MD

## 2023-03-25 LAB
25(OH)D3+25(OH)D2 SERPL-MCNC: 48.7 NG/ML (ref 30–100)
ALBUMIN SERPL-MCNC: 4.6 G/DL (ref 3.7–4.7)
ALBUMIN/GLOB SERPL: 2.1 {RATIO} (ref 1.2–2.2)
ALP SERPL-CCNC: 81 IU/L (ref 44–121)
ALT SERPL-CCNC: 11 IU/L (ref 0–32)
AST SERPL-CCNC: 18 IU/L (ref 0–40)
BASOPHILS # BLD AUTO: 0.1 X10E3/UL (ref 0–0.2)
BASOPHILS NFR BLD AUTO: 1 %
BILIRUB SERPL-MCNC: 0.3 MG/DL (ref 0–1.2)
BUN SERPL-MCNC: 8 MG/DL (ref 8–27)
BUN/CREAT SERPL: 10 (ref 12–28)
CALCIUM SERPL-MCNC: 8.9 MG/DL (ref 8.7–10.3)
CHLORIDE SERPL-SCNC: 101 MMOL/L (ref 96–106)
CHOLEST SERPL-MCNC: 219 MG/DL (ref 100–199)
CO2 SERPL-SCNC: 21 MMOL/L (ref 20–29)
CREAT SERPL-MCNC: 0.81 MG/DL (ref 0.57–1)
EGFRCR SERPLBLD CKD-EPI 2021: 75 ML/MIN/1.73
EOSINOPHIL # BLD AUTO: 0.1 X10E3/UL (ref 0–0.4)
EOSINOPHIL NFR BLD AUTO: 2 %
ERYTHROCYTE [DISTWIDTH] IN BLOOD BY AUTOMATED COUNT: 11.9 % (ref 11.7–15.4)
FOLATE SERPL-MCNC: 17.8 NG/ML
GLOBULIN SER CALC-MCNC: 2.2 G/DL (ref 1.5–4.5)
GLUCOSE SERPL-MCNC: 85 MG/DL (ref 70–99)
HBA1C MFR BLD: 5.5 % (ref 4.8–5.6)
HCT VFR BLD AUTO: 36.8 % (ref 34–46.6)
HCV IGG SERPL QL IA: NON REACTIVE
HDLC SERPL-MCNC: 89 MG/DL
HGB BLD-MCNC: 12.4 G/DL (ref 11.1–15.9)
IMM GRANULOCYTES # BLD AUTO: 0 X10E3/UL (ref 0–0.1)
IMM GRANULOCYTES NFR BLD AUTO: 0 %
LDLC SERPL CALC-MCNC: 121 MG/DL (ref 0–99)
LYMPHOCYTES # BLD AUTO: 1.4 X10E3/UL (ref 0.7–3.1)
LYMPHOCYTES NFR BLD AUTO: 23 %
MCH RBC QN AUTO: 31.6 PG (ref 26.6–33)
MCHC RBC AUTO-ENTMCNC: 33.7 G/DL (ref 31.5–35.7)
MCV RBC AUTO: 94 FL (ref 79–97)
MONOCYTES # BLD AUTO: 0.9 X10E3/UL (ref 0.1–0.9)
MONOCYTES NFR BLD AUTO: 15 %
NEUTROPHILS # BLD AUTO: 3.5 X10E3/UL (ref 1.4–7)
NEUTROPHILS NFR BLD AUTO: 59 %
PLATELET # BLD AUTO: 241 X10E3/UL (ref 150–450)
POTASSIUM SERPL-SCNC: 5 MMOL/L (ref 3.5–5.2)
PROT SERPL-MCNC: 6.8 G/DL (ref 6–8.5)
RBC # BLD AUTO: 3.93 X10E6/UL (ref 3.77–5.28)
SODIUM SERPL-SCNC: 135 MMOL/L (ref 134–144)
T4 FREE SERPL-MCNC: 1.16 NG/DL (ref 0.82–1.77)
TRIGL SERPL-MCNC: 53 MG/DL (ref 0–149)
TSH SERPL DL<=0.005 MIU/L-ACNC: 1.72 UIU/ML (ref 0.45–4.5)
VIT B12 SERPL-MCNC: 1192 PG/ML (ref 232–1245)
VLDLC SERPL CALC-MCNC: 9 MG/DL (ref 5–40)
WBC # BLD AUTO: 5.9 X10E3/UL (ref 3.4–10.8)

## 2023-03-27 ENCOUNTER — LAB (OUTPATIENT)
Dept: FAMILY MEDICINE CLINIC | Facility: CLINIC | Age: 78
End: 2023-03-27
Payer: MEDICARE

## 2023-03-27 DIAGNOSIS — M81.0 SENILE OSTEOPOROSIS: Primary | ICD-10-CM

## 2023-03-28 LAB
BUN SERPL-MCNC: 7 MG/DL (ref 8–27)
BUN/CREAT SERPL: 9 (ref 12–28)
CALCIUM SERPL-MCNC: 8.7 MG/DL (ref 8.7–10.3)
CHLORIDE SERPL-SCNC: 103 MMOL/L (ref 96–106)
CO2 SERPL-SCNC: 23 MMOL/L (ref 20–29)
CREAT SERPL-MCNC: 0.79 MG/DL (ref 0.57–1)
EGFRCR SERPLBLD CKD-EPI 2021: 77 ML/MIN/1.73
GLUCOSE SERPL-MCNC: 80 MG/DL (ref 70–99)
POTASSIUM SERPL-SCNC: 4.8 MMOL/L (ref 3.5–5.2)
SODIUM SERPL-SCNC: 137 MMOL/L (ref 134–144)

## 2023-04-27 RX ORDER — OMEPRAZOLE 40 MG/1
CAPSULE, DELAYED RELEASE ORAL
Qty: 90 CAPSULE | Refills: 3 | Status: SHIPPED | OUTPATIENT
Start: 2023-04-27

## 2023-05-24 ENCOUNTER — OFFICE VISIT (OUTPATIENT)
Dept: FAMILY MEDICINE CLINIC | Facility: CLINIC | Age: 78
End: 2023-05-24
Payer: MEDICARE

## 2023-05-24 VITALS
TEMPERATURE: 97.7 F | SYSTOLIC BLOOD PRESSURE: 120 MMHG | OXYGEN SATURATION: 99 % | HEART RATE: 97 BPM | BODY MASS INDEX: 24.36 KG/M2 | HEIGHT: 64 IN | DIASTOLIC BLOOD PRESSURE: 76 MMHG | WEIGHT: 142.7 LBS

## 2023-05-24 DIAGNOSIS — H65.03 NON-RECURRENT ACUTE SEROUS OTITIS MEDIA OF BOTH EARS: Primary | ICD-10-CM

## 2023-05-24 DIAGNOSIS — J30.89 SEASONAL ALLERGIC RHINITIS DUE TO OTHER ALLERGIC TRIGGER: ICD-10-CM

## 2023-05-24 PROBLEM — M81.0 OSTEOPOROSIS: Status: ACTIVE | Noted: 2021-08-03

## 2023-05-24 RX ORDER — IPRATROPIUM BROMIDE 21 UG/1
2 SPRAY, METERED NASAL EVERY 12 HOURS
Qty: 30 ML | Refills: 2 | Status: SHIPPED | OUTPATIENT
Start: 2023-05-24

## 2023-05-24 RX ORDER — PREDNISONE 20 MG/1
TABLET ORAL
Qty: 8 TABLET | Refills: 0 | Status: SHIPPED | OUTPATIENT
Start: 2023-05-24

## 2023-05-24 NOTE — PROGRESS NOTES
".Chief Complaint  Sinusitis    Subjective          History of Present Illness  Millie De La Fuente is here today with concern for sinus infection  Patient states that she has had increased runny nose and congestion for the past couple of weeks.  She states she has had clear drainage.  She states she is more hoarse than usual.  She states she been feeling like she is dizzy and feels like she might be following.  She states that her ears are full.  She states that she has been to an allergist and found out that she is allergic to most things outside as well as dust.  She states that she does not take antihistamines orally because of her dry eye issues.  She has been taking Flonase and that seems to be helping her nose.  She denies any fever.    Objective   Vital Signs:   /76 (BP Location: Left arm, Patient Position: Sitting, Cuff Size: Adult)   Pulse 97   Temp 97.7 °F (36.5 °C)   Ht 162.6 cm (64\")   Wt 64.7 kg (142 lb 11.2 oz)   SpO2 99%   BMI 24.49 kg/m²     Body mass index is 24.49 kg/m².      Review of Systems      Current Outpatient Medications:   •  acetaminophen (TYLENOL) 500 MG tablet, Take 400 mg by mouth Every 6 (Six) Hours As Needed for Mild Pain ., Disp: , Rfl:   •  Calcium Carbonate-Vitamin D (calcium-vitamin D) 500-200 MG-UNIT tablet per tablet, , Disp: , Rfl:   •  Cholecalciferol (VITAMIN D3) 125 MCG (5000 UT) capsule capsule, Take 1 capsule by mouth Daily., Disp: , Rfl:   •  cyanocobalamin (VITAMIN B-12) 1000 MCG tablet, , Disp: , Rfl:   •  denosumab (PROLIA) 60 MG/ML solution prefilled syringe syringe, Inject  under the skin into the appropriate area as directed 1 (One) Time., Disp: , Rfl:   •  gabapentin (NEURONTIN) 100 MG capsule, TAKE 3 CAPSULES BY MOUTH AT NIGHT, Disp: 90 capsule, Rfl: 3  •  Multiple Vitamins-Minerals (HAIR SKIN & NAILS ADVANCED PO), Take  by mouth., Disp: , Rfl:   •  Multiple Vitamins-Minerals (PRESERVISION AREDS 2 PO), take one tablet po qd, Disp: , Rfl:   •  omeprazole " (priLOSEC) 40 MG capsule, TAKE 1 CAPSULE BY MOUTH ONCE DAILY BEFORE A MEAL, Disp: 90 capsule, Rfl: 3  •  ipratropium (ATROVENT) 0.03 % nasal spray, 2 sprays into the nostril(s) as directed by provider Every 12 (Twelve) Hours., Disp: 30 mL, Rfl: 2  •  predniSONE (DELTASONE) 20 MG tablet, 2 tab po x 2 then 1 tab po qd x 3 days, Disp: 8 tablet, Rfl: 0    Allergies: Codeine, Erythromycin, Clindamycin/lincomycin, Sulfa antibiotics, and Penicillins    Physical Exam  Vitals and nursing note reviewed.   Constitutional:       Appearance: Normal appearance.   HENT:      Head: Normocephalic and atraumatic.      Right Ear: Ear canal and external ear normal.      Left Ear: Ear canal and external ear normal.      Ears:      Comments: Clear air fluid levels behind TM wally     Nose: Congestion present.      Mouth/Throat:      Mouth: Mucous membranes are moist.      Pharynx: Posterior oropharyngeal erythema present.      Comments: Copious clear post nasal drainage    Eyes:      Pupils: Pupils are equal, round, and reactive to light.   Cardiovascular:      Rate and Rhythm: Normal rate and regular rhythm.      Heart sounds: Normal heart sounds.   Pulmonary:      Effort: Pulmonary effort is normal.      Breath sounds: Normal breath sounds.   Neurological:      General: No focal deficit present.      Mental Status: She is alert.   Psychiatric:         Mood and Affect: Mood normal.          Result Review :                   Assessment and Plan    Diagnoses and all orders for this visit:    1. Non-recurrent acute serous otitis media of both ears (Primary)  -     ipratropium (ATROVENT) 0.03 % nasal spray; 2 sprays into the nostril(s) as directed by provider Every 12 (Twelve) Hours.  Dispense: 30 mL; Refill: 2  -     predniSONE (DELTASONE) 20 MG tablet; 2 tab po x 2 then 1 tab po qd x 3 days  Dispense: 8 tablet; Refill: 0  We will add Atrovent nasal spray to help decrease her postnasal drainage which will help with her hoarseness and the  amount of fluid behind the ears.  We will also give her prednisone to decrease amount of fluid behind her ears as well.  2. Seasonal allergic rhinitis due to other allergic trigger  -     ipratropium (ATROVENT) 0.03 % nasal spray; 2 sprays into the nostril(s) as directed by provider Every 12 (Twelve) Hours.  Dispense: 30 mL; Refill: 2  See as above      Follow Up   No follow-ups on file.  Patient was given instructions and counseling regarding her condition or for health maintenance advice. Please see specific information pulled into the AVS if appropriate.     MIRIAM Tabares  05/24/2023

## 2023-06-13 DIAGNOSIS — G62.9 POLYNEUROPATHY: ICD-10-CM

## 2023-06-13 NOTE — TELEPHONE ENCOUNTER
Rx Refill Note  Requested Prescriptions     Pending Prescriptions Disp Refills    gabapentin (NEURONTIN) 100 MG capsule [Pharmacy Med Name: Gabapentin 100 MG Oral Capsule] 90 capsule 0     Sig: TAKE 3 CAPSULES BY MOUTH AT NIGHT      Last filled:02/23/23  Last office visit with prescribing clinician: 6/28/2022      Next office visit with prescribing clinician: 6/26/2023     Rubina Bains MA  06/13/23, 15:46 EDT

## 2023-06-14 RX ORDER — GABAPENTIN 100 MG/1
CAPSULE ORAL
Qty: 90 CAPSULE | Refills: 3 | Status: SHIPPED | OUTPATIENT
Start: 2023-06-14

## 2023-08-19 ENCOUNTER — OFFICE VISIT (OUTPATIENT)
Dept: FAMILY MEDICINE CLINIC | Facility: CLINIC | Age: 78
End: 2023-08-19
Payer: MEDICARE

## 2023-08-19 VITALS
WEIGHT: 142 LBS | SYSTOLIC BLOOD PRESSURE: 102 MMHG | HEIGHT: 64 IN | OXYGEN SATURATION: 98 % | DIASTOLIC BLOOD PRESSURE: 70 MMHG | HEART RATE: 84 BPM | BODY MASS INDEX: 24.24 KG/M2

## 2023-08-19 DIAGNOSIS — J01.00 ACUTE NON-RECURRENT MAXILLARY SINUSITIS: Primary | ICD-10-CM

## 2023-08-19 DIAGNOSIS — H92.02 LEFT EAR PAIN: ICD-10-CM

## 2023-08-19 LAB
EXPIRATION DATE: NORMAL
FLUAV AG UPPER RESP QL IA.RAPID: NOT DETECTED
FLUBV AG UPPER RESP QL IA.RAPID: NOT DETECTED
INTERNAL CONTROL: NORMAL
Lab: NORMAL
SARS-COV-2 AG UPPER RESP QL IA.RAPID: NOT DETECTED

## 2023-08-19 PROCEDURE — 1160F RVW MEDS BY RX/DR IN RCRD: CPT | Performed by: NURSE PRACTITIONER

## 2023-08-19 PROCEDURE — 1159F MED LIST DOCD IN RCRD: CPT | Performed by: NURSE PRACTITIONER

## 2023-08-19 PROCEDURE — 99214 OFFICE O/P EST MOD 30 MIN: CPT | Performed by: NURSE PRACTITIONER

## 2023-08-19 PROCEDURE — 87428 SARSCOV & INF VIR A&B AG IA: CPT | Performed by: NURSE PRACTITIONER

## 2023-08-19 RX ORDER — DOXYCYCLINE HYCLATE 100 MG/1
100 CAPSULE ORAL 2 TIMES DAILY
Qty: 20 CAPSULE | Refills: 0 | Status: SHIPPED | OUTPATIENT
Start: 2023-08-19

## 2023-08-19 NOTE — PROGRESS NOTES
"Chief Complaint  Sinus Problem (Sinus pressure when bending over.), Tingling (In arms and legs, worse), and Ear Fullness (Feels like she's under water, started yesterday.)    Subjective          Millie De La Fuente presents to [facility]  History of Present Illness  Pt has had sinus pressure and left ear pain/fullness  x 5 days. She denies fever, chills, or myalgias. She denies cough or nasal drainage.   Sinus Problem  Associated symptoms include ear pain. Pertinent negatives include no shortness of breath.   Ear Fullness       Objective   Vital Signs:   /70   Pulse 84   Ht 162.6 cm (64\")   Wt 64.4 kg (142 lb)   SpO2 98%   BMI 24.37 kg/mý     Body mass index is 24.37 kg/mý.    Review of Systems   Constitutional:  Negative for fatigue and fever.   HENT:  Positive for ear pain.    Respiratory:  Negative for shortness of breath.    Cardiovascular:  Negative for chest pain, palpitations and leg swelling.   Neurological:  Negative for syncope.   Psychiatric/Behavioral:  The patient is not nervous/anxious.         Current Outpatient Medications:     acetaminophen (TYLENOL) 500 MG tablet, Take 400 mg by mouth Every 6 (Six) Hours As Needed for Mild Pain ., Disp: , Rfl:     Calcium Carbonate-Vitamin D (calcium-vitamin D) 500-200 MG-UNIT tablet per tablet, , Disp: , Rfl:     Cholecalciferol (VITAMIN D3) 125 MCG (5000 UT) capsule capsule, Take 1 capsule by mouth Daily., Disp: , Rfl:     cyanocobalamin (VITAMIN B-12) 1000 MCG tablet, , Disp: , Rfl:     denosumab (PROLIA) 60 MG/ML solution prefilled syringe syringe, Inject  under the skin into the appropriate area as directed 1 (One) Time., Disp: , Rfl:     EPINEPHrine (EPIPEN) 0.3 MG/0.3ML solution auto-injector injection, INJECT 1 PEN INTRAMUSCULARLY ONCE AS DIRECTED FOR ALLERGIC REACTION, Disp: , Rfl:     gabapentin (NEURONTIN) 100 MG capsule, Take 1 capsule in the morning and 3 capsules at night, Disp: 120 capsule, Rfl: 5    Multiple " Vitamins-Minerals (PRESERVISION AREDS 2 PO), take one tablet po qd, Disp: , Rfl:     omeprazole (priLOSEC) 40 MG capsule, TAKE 1 CAPSULE BY MOUTH ONCE DAILY BEFORE A MEAL, Disp: 90 capsule, Rfl: 3    doxycycline (VIBRAMYCIN) 100 MG capsule, Take 1 capsule by mouth 2 (Two) Times a Day., Disp: 20 capsule, Rfl: 0      Allergies: Codeine, Erythromycin, Clindamycin/lincomycin, Sulfa antibiotics, and Penicillins    Physical Exam  Constitutional:       Appearance: Normal appearance.   HENT:      Head: Normocephalic.   Eyes:      Conjunctiva/sclera: Conjunctivae normal.      Pupils: Pupils are equal, round, and reactive to light.   Cardiovascular:      Rate and Rhythm: Normal rate and regular rhythm.      Heart sounds: Normal heart sounds.   Pulmonary:      Effort: Pulmonary effort is normal.      Breath sounds: Normal breath sounds.   Abdominal:      Tenderness: There is no abdominal tenderness.   Musculoskeletal:         General: Normal range of motion.   Skin:     General: Skin is warm and dry.      Capillary Refill: Capillary refill takes less than 2 seconds.   Neurological:      General: No focal deficit present.      Mental Status: She is alert and oriented to person, place, and time.   Psychiatric:         Mood and Affect: Mood normal.         Behavior: Behavior normal.         Thought Content: Thought content normal.         Judgment: Judgment normal.        Result Review :                   Assessment and Plan    Diagnoses and all orders for this visit:    1. Acute non-recurrent maxillary sinusitis (Primary)  Comments:  Finish antibiotics. Flonase and Zyrtec daily until sx improve. RTC for worsened sx and if not improving in 1 week.  Orders:  -     POCT SARS-CoV-2 Antigen RAFAEL  -     doxycycline (VIBRAMYCIN) 100 MG capsule; Take 1 capsule by mouth 2 (Two) Times a Day.  Dispense: 20 capsule; Refill: 0    2. Left ear pain                Follow Up   Return in about 1 week (around 8/26/2023) for if not improving or  sooner if symptoms worsen.  Patient was given instructions and counseling regarding her condition or for health maintenance advice. Please see specific information pulled into the AVS if appropriate.     OLEG Marino

## 2023-10-09 ENCOUNTER — TELEPHONE (OUTPATIENT)
Dept: FAMILY MEDICINE CLINIC | Facility: CLINIC | Age: 78
End: 2023-10-09

## 2023-10-09 ENCOUNTER — OFFICE VISIT (OUTPATIENT)
Dept: FAMILY MEDICINE CLINIC | Facility: CLINIC | Age: 78
End: 2023-10-09
Payer: MEDICARE

## 2023-10-09 VITALS
WEIGHT: 146 LBS | SYSTOLIC BLOOD PRESSURE: 130 MMHG | DIASTOLIC BLOOD PRESSURE: 64 MMHG | HEIGHT: 64 IN | HEART RATE: 88 BPM | OXYGEN SATURATION: 96 % | BODY MASS INDEX: 24.92 KG/M2 | TEMPERATURE: 99.8 F

## 2023-10-09 DIAGNOSIS — R05.9 COUGH, UNSPECIFIED TYPE: ICD-10-CM

## 2023-10-09 DIAGNOSIS — U07.1 COVID-19 VIRUS DETECTED: Primary | ICD-10-CM

## 2023-10-09 LAB
EXPIRATION DATE: ABNORMAL
EXPIRATION DATE: NORMAL
FLUAV AG UPPER RESP QL IA.RAPID: NOT DETECTED
FLUBV AG UPPER RESP QL IA.RAPID: NOT DETECTED
INTERNAL CONTROL: ABNORMAL
INTERNAL CONTROL: NORMAL
Lab: ABNORMAL
Lab: NORMAL
S PYO AG THROAT QL: NEGATIVE
SARS-COV-2 AG UPPER RESP QL IA.RAPID: DETECTED

## 2023-10-09 NOTE — PROGRESS NOTES
"Chief Complaint  Sore Throat (Sore throat, body aches, congestion started yesterday )    Subjective          Millie De La Fuente presents to [facility]  History of Present Illness  Patient in today for evaluation on sore thorat, body aches and mild nasal congestion. States has only had minimal cough. No vomiting or diarrhea. Has had low grade fever.       Objective   Vital Signs:   /64   Pulse 88   Temp 99.8 øF (37.7 øC)   Ht 162.6 cm (64\")   Wt 66.2 kg (146 lb)   SpO2 96%   BMI 25.06 kg/mý     Body mass index is 25.06 kg/mý.    Review of Systems   Constitutional:  Positive for fever.   HENT:  Positive for congestion and sore throat.    Respiratory:  Positive for cough. Negative for shortness of breath.    Cardiovascular:  Negative for chest pain.   Gastrointestinal:  Negative for abdominal pain, diarrhea, nausea and vomiting.   Neurological:  Positive for headache. Negative for dizziness.       Past History:  Medical History: has a past medical history of Allergic rhinitis due to pollen, Atypical facial pain, Chronic vertigo, Coxarthrosis, Diverticulosis, Fibromyalgia, Gastroesophageal reflux disease without esophagitis, GERD (gastroesophageal reflux disease), History of drug dependence, Hyperlipidemia, Injury of kidney, Irritable bowel disease, Macular degeneration, Macular disorder, Migraine, Mixed hyperlipidemia, Osteoarthritis, Osteoporosis, Peripheral neuropathy, Polyneuropathy, Shingles, and Vitamin D deficiency.   Surgical History: has a past surgical history that includes Hysterectomy; Shoulder surgery; Tonsillectomy; Appendectomy; and Colonoscopy (2016).   Family History: family history includes Alcohol abuse in an other family member; Alzheimer's disease in her mother; Aortic aneurysm in her brother; Breast cancer (age of onset: 57) in her sister; Congenital heart disease in her father; Heart failure in her father; Hyperlipidemia in her mother; Hypertension in her mother; " Stroke in her mother.   Social History: reports that she quit smoking about 31 years ago. Her smoking use included cigarettes. She started smoking about 58 years ago. She has a 13.50 pack-year smoking history. She has been exposed to tobacco smoke. She has never used smokeless tobacco. She reports that she does not currently use alcohol. She reports that she does not use drugs.      Current Outpatient Medications:     acetaminophen (TYLENOL) 500 MG tablet, Take 400 mg by mouth Every 6 (Six) Hours As Needed for Mild Pain ., Disp: , Rfl:     Calcium Carbonate-Vitamin D (calcium-vitamin D) 500-200 MG-UNIT tablet per tablet, , Disp: , Rfl:     Cholecalciferol (VITAMIN D3) 125 MCG (5000 UT) capsule capsule, Take 1 capsule by mouth Daily., Disp: , Rfl:     cyanocobalamin (VITAMIN B-12) 1000 MCG tablet, , Disp: , Rfl:     denosumab (PROLIA) 60 MG/ML solution prefilled syringe syringe, Inject  under the skin into the appropriate area as directed 1 (One) Time., Disp: , Rfl:     EPINEPHrine (EPIPEN) 0.3 MG/0.3ML solution auto-injector injection, INJECT 1 PEN INTRAMUSCULARLY ONCE AS DIRECTED FOR ALLERGIC REACTION, Disp: , Rfl:     gabapentin (NEURONTIN) 100 MG capsule, Take 1 capsule in the morning and 3 capsules at night, Disp: 120 capsule, Rfl: 5    Multiple Vitamins-Minerals (PRESERVISION AREDS 2 PO), take one tablet po qd, Disp: , Rfl:     omeprazole (priLOSEC) 40 MG capsule, TAKE 1 CAPSULE BY MOUTH ONCE DAILY BEFORE A MEAL, Disp: 90 capsule, Rfl: 3    Nirmatrelvir&Ritonavir 300/100 (PAXLOVID) 20 x 150 MG & 10 x 100MG tablet therapy pack tablet, Take 3 tablets by mouth 2 (Two) Times a Day for 5 days., Disp: 30 tablet, Rfl: 0  Allergies: Codeine, Erythromycin, Clindamycin/lincomycin, Sulfa antibiotics, and Penicillins    Physical Exam  Constitutional:       Appearance: Normal appearance.   HENT:      Right Ear: Tympanic membrane normal.      Left Ear: Tympanic membrane normal.      Mouth/Throat:      Pharynx: Oropharynx is  clear.   Eyes:      Conjunctiva/sclera: Conjunctivae normal.      Pupils: Pupils are equal, round, and reactive to light.   Cardiovascular:      Rate and Rhythm: Normal rate and regular rhythm.      Heart sounds: Normal heart sounds.   Pulmonary:      Effort: Pulmonary effort is normal.      Breath sounds: Normal breath sounds.   Abdominal:      Tenderness: There is no abdominal tenderness.   Neurological:      Mental Status: She is oriented to person, place, and time.   Psychiatric:         Mood and Affect: Mood normal.         Behavior: Behavior normal.             Assessment and Plan   Diagnoses and all orders for this visit:    1. COVID-19 virus detected (Primary)  Rapid covid testing positive; negative flu and strep testing; discussed option of treatment with paxlovid and patient would like to have this sent in - discussed possible side effects of medication; she denies chronic kidney disease; last GFR was 77; encouraged good hydration, rest and symptom management as needed; encouraged monitoring symptoms closely; discussed if any progression of symptoms to ER if needed; discussed recommendation of time for isolation and wearing of mask through day #10 from symptom onset  2. Cough, unspecified type  -     Covid-19 + Flu A&B AG, Veritor  -     POC Rapid Strep A    Other orders  -     Nirmatrelvir&Ritonavir 300/100 (PAXLOVID) 20 x 150 MG & 10 x 100MG tablet therapy pack tablet; Take 3 tablets by mouth 2 (Two) Times a Day for 5 days.  Dispense: 30 tablet; Refill: 0            Follow Up   No follow-ups on file.  Patient was given instructions and counseling regarding her condition or for health maintenance advice. Please see specific information pulled into the AVS if appropriate.     Sabina Henderson PA-C

## 2023-10-31 ENCOUNTER — OFFICE VISIT (OUTPATIENT)
Dept: FAMILY MEDICINE CLINIC | Facility: CLINIC | Age: 78
End: 2023-10-31
Payer: MEDICARE

## 2023-10-31 VITALS
SYSTOLIC BLOOD PRESSURE: 122 MMHG | BODY MASS INDEX: 24.92 KG/M2 | WEIGHT: 146 LBS | DIASTOLIC BLOOD PRESSURE: 70 MMHG | OXYGEN SATURATION: 98 % | HEIGHT: 64 IN | HEART RATE: 82 BPM

## 2023-10-31 DIAGNOSIS — K21.9 GASTROESOPHAGEAL REFLUX DISEASE WITHOUT ESOPHAGITIS: ICD-10-CM

## 2023-10-31 DIAGNOSIS — Z79.899 HIGH RISK MEDICATION USE: ICD-10-CM

## 2023-10-31 DIAGNOSIS — R53.83 OTHER FATIGUE: ICD-10-CM

## 2023-10-31 DIAGNOSIS — M79.7 FIBROMYALGIA: ICD-10-CM

## 2023-10-31 DIAGNOSIS — E78.2 MIXED HYPERLIPIDEMIA: ICD-10-CM

## 2023-10-31 DIAGNOSIS — F33.8 SEASONAL AFFECTIVE DISORDER: ICD-10-CM

## 2023-10-31 DIAGNOSIS — R73.9 BLOOD GLUCOSE ELEVATED: ICD-10-CM

## 2023-10-31 DIAGNOSIS — G62.9 POLYNEUROPATHY: ICD-10-CM

## 2023-10-31 DIAGNOSIS — Z00.01 ENCOUNTER FOR GENERAL ADULT MEDICAL EXAMINATION WITH ABNORMAL FINDINGS: Primary | ICD-10-CM

## 2023-10-31 DIAGNOSIS — E55.9 VITAMIN D DEFICIENCY: ICD-10-CM

## 2023-10-31 DIAGNOSIS — J30.1 NON-SEASONAL ALLERGIC RHINITIS DUE TO POLLEN: ICD-10-CM

## 2023-10-31 DIAGNOSIS — H35.30 MACULAR DEGENERATION, UNSPECIFIED LATERALITY, UNSPECIFIED TYPE: ICD-10-CM

## 2023-10-31 NOTE — PROGRESS NOTES
The ABCs of the Annual Wellness Visit  Subsequent Medicare Wellness Visit    Subjective    Millie De La Fuente is a 78 y.o. female who presents for a Subsequent Medicare Wellness Visit.    The following portions of the patient's history were reviewed and   updated as appropriate: allergies, current medications, past family history, past medical history, past social history, past surgical history, and problem list.    Compared to one year ago, the patient feels her physical   health is worse.    Compared to one year ago, the patient feels her mental   health is the same.    Recent Hospitalizations:  She was not admitted to the hospital during the last year.       Current Medical Providers:  Patient Care Team:  Remi Morris MD as PCP - General (Family Medicine)  Remi Morris MD as Consulting Physician (Family Medicine)    Outpatient Medications Prior to Visit   Medication Sig Dispense Refill    acetaminophen (TYLENOL) 500 MG tablet Take 400 mg by mouth Every 6 (Six) Hours As Needed for Mild Pain .      Calcium Carbonate-Vitamin D (calcium-vitamin D) 500-200 MG-UNIT tablet per tablet       cyanocobalamin (VITAMIN B-12) 1000 MCG tablet       denosumab (PROLIA) 60 MG/ML solution prefilled syringe syringe Inject  under the skin into the appropriate area as directed 1 (One) Time.      EPINEPHrine (EPIPEN) 0.3 MG/0.3ML solution auto-injector injection INJECT 1 PEN INTRAMUSCULARLY ONCE AS DIRECTED FOR ALLERGIC REACTION      gabapentin (NEURONTIN) 100 MG capsule Take 1 capsule in the morning and 3 capsules at night 120 capsule 5    Multiple Vitamins-Minerals (PRESERVISION AREDS 2 PO) take one tablet po qd      omeprazole (priLOSEC) 40 MG capsule TAKE 1 CAPSULE BY MOUTH ONCE DAILY BEFORE A MEAL 90 capsule 3    Cholecalciferol (VITAMIN D3) 125 MCG (5000 UT) capsule capsule Take 1 capsule by mouth Daily. (Patient not taking: Reported on 10/31/2023)       No facility-administered medications prior to visit.       No opioid medication  "identified on active medication list. I have reviewed chart for other potential  high risk medication/s and harmful drug interactions in the elderly.        Aspirin is not on active medication list.  Aspirin use is not indicated based on review of current medical condition/s. Risk of harm outweighs potential benefits.  .    Patient Active Problem List   Diagnosis    Fibromyalgia    Allergic rhinitis due to pollen    Gastroesophageal reflux disease without esophagitis    High risk medication use    Macular degeneration    Mixed hyperlipidemia    Polyneuropathy    Primary osteoarthritis of both hips    Senile osteoporosis    Vitamin D deficiency    Acute non-recurrent maxillary sinusitis    Osteoporosis    Seasonal affective disorder     Advance Care Planning   Advance Care Planning     Advance Directive is not on file.  ACP discussion was held with the patient during this visit. Patient has an advance directive (not in EMR), copy requested.     Objective    Vitals:    10/31/23 1507   BP: 122/70   BP Location: Left arm   Patient Position: Sitting   Cuff Size: Adult   Pulse: 82   SpO2: 98%   Weight: 66.2 kg (146 lb)   Height: 162.6 cm (64\")     Estimated body mass index is 25.06 kg/m² as calculated from the following:    Height as of this encounter: 162.6 cm (64\").    Weight as of this encounter: 66.2 kg (146 lb).    BMI is >= 25 and <30. (Overweight) The following options were offered after discussion;: weight loss educational material (shared in after visit summary)      Does the patient have evidence of cognitive impairment? No          HEALTH RISK ASSESSMENT    Smoking Status:  Social History     Tobacco Use   Smoking Status Former    Packs/day: 1.00    Years: 27.00    Additional pack years: 0.00    Total pack years: 27.00    Types: Cigarettes    Start date: 1965    Quit date: 1992    Years since quittin.8    Passive exposure: Past   Smokeless Tobacco Never   Tobacco Comments    I quit several times " and was a closet smoker for the last 10 years     Alcohol Consumption:  Social History     Substance and Sexual Activity   Alcohol Use Not Currently    Comment: stopped drinking in 1987     Fall Risk Screen:    MERCEDES Fall Risk Assessment was completed, and patient is at LOW risk for falls.Assessment completed on:10/31/2023    Depression Screening:      10/31/2023     3:10 PM   PHQ-2/PHQ-9 Depression Screening   Little Interest or Pleasure in Doing Things 0-->not at all   Feeling Down, Depressed or Hopeless 0-->not at all   PHQ-9: Brief Depression Severity Measure Score 0       Health Habits and Functional and Cognitive Screening:      10/31/2023     3:10 PM   Functional & Cognitive Status   Do you have difficulty preparing food and eating? No   Do you have difficulty bathing yourself, getting dressed or grooming yourself? No   Do you have difficulty using the toilet? No   Do you have difficulty moving around from place to place? No   Do you have trouble with steps or getting out of a bed or a chair? No   Current Diet Well Balanced Diet   Dental Exam Not up to date   Eye Exam Up to date   Exercise (times per week) 7 times per week   Current Exercises Include Walking   Do you need help using the phone?  No   Are you deaf or do you have serious difficulty hearing?  No   Do you need help to go to places out of walking distance? Yes   Do you need help shopping? No   Do you need help preparing meals?  No   Do you need help with housework?  No   Do you need help with laundry? No   Do you need help taking your medications? No   Do you need help managing money? No   Do you ever drive or ride in a car without wearing a seat belt? No   Have you felt unusual stress, anger or loneliness in the last month? No   Who do you live with? Spouse   If you need help, do you have trouble finding someone available to you? No   Have you been bothered in the last four weeks by sexual problems? No   Do you have difficulty concentrating,  remembering or making decisions? No       Age-appropriate Screening Schedule:  Refer to the list below for future screening recommendations based on patient's age, sex and/or medical conditions. Orders for these recommended tests are listed in the plan section. The patient has been provided with a written plan.    Health Maintenance   Topic Date Due    BMI FOLLOWUP  Never done    INFLUENZA VACCINE  08/01/2023    ANNUAL WELLNESS VISIT  11/21/2023 (Originally 6/11/2022)    ZOSTER VACCINE (1 of 2) 05/24/2024 (Originally 7/10/1995)    COVID-19 Vaccine (2 - 2023-24 season) 10/11/2024 (Originally 9/1/2023)    LIPID PANEL  03/24/2024    TDAP/TD VACCINES (2 - Tdap) 05/13/2024    DXA SCAN  07/07/2024    HEPATITIS C SCREENING  Completed    Pneumococcal Vaccine 65+  Completed                  CMS Preventative Services Quick Reference  Risk Factors Identified During Encounter  None Identified  The above risks/problems have been discussed with the patient.  Pertinent information has been shared with the patient in the After Visit Summary.  An After Visit Summary and PPPS were made available to the patient.    Follow Up:   Next Medicare Wellness visit to be scheduled in 1 year.       Additional E&M Note during same encounter follows:  Patient has multiple medical problems which are significant and separately identifiable that require additional work above and beyond the Medicare Wellness Visit.      Chief Complaint  Medicare Wellness-subsequent and Annual Exam    Subjective        HPI  Millie De La Fuente is also being seen today for annual exam.  The patient had COVID about a month ago, and while she has recovered for the most part, she still has quite a bit of fatigue.  However, she says she is always had the fatigue, and unfortunately there is not been much that helps.  She adamantly denies any depression, although she clearly has anhedonia.  She has chronic nasal allergies and has tried multiple treatments without any relief, and the  allergist has ordered a special type of nasal spray from [place] or [place] that she is waiting on currently.  She does have some increase in the aching in her legs, which she describes as a vibrating sort of sensation, sometimes tingling.  She is followed by neurologist for neuropathy and restless leg syndrome.  She had labs done earlier this year but she would like to repeat labs, and she decided not to take the cholesterol medicine, and would like to have things retested.  She does describe some fullness in her ears, but no ear pain or drainage from the ears    Review of Systems   Constitutional:  Positive for fatigue. Negative for activity change, appetite change, chills and fever.   HENT:  Positive for congestion, postnasal drip, rhinorrhea and sinus pressure. Negative for ear discharge, ear pain, facial swelling, nosebleeds, sore throat, trouble swallowing and voice change.    Eyes:  Positive for itching. Negative for photophobia, discharge and redness.   Respiratory:  Negative for cough, choking, chest tightness, shortness of breath and wheezing.    Cardiovascular:  Negative for chest pain, palpitations and leg swelling.   Gastrointestinal:  Negative for abdominal pain, blood in stool, constipation, diarrhea, nausea and vomiting.   Endocrine: Negative for polydipsia, polyphagia and polyuria.   Genitourinary:  Negative for dysuria and hematuria.   Musculoskeletal:  Positive for arthralgias and myalgias. Negative for back pain, gait problem, joint swelling and neck pain.   Skin:  Negative for rash and wound.   Allergic/Immunologic: Positive for environmental allergies.   Neurological:  Negative for tremors, seizures, syncope, facial asymmetry, speech difficulty, weakness, light-headedness and numbness.   Hematological:  Negative for adenopathy.   Psychiatric/Behavioral:  Positive for dysphoric mood. Negative for agitation, behavioral problems, hallucinations, self-injury and suicidal ideas. The patient is  "nervous/anxious.        Objective   Vital Signs:  /70 (BP Location: Left arm, Patient Position: Sitting, Cuff Size: Adult)   Pulse 82   Ht 162.6 cm (64\")   Wt 66.2 kg (146 lb)   SpO2 98%   BMI 25.06 kg/m²     Physical Exam  Constitutional:       General: She is not in acute distress.     Appearance: She is normal weight. She is not ill-appearing, toxic-appearing or diaphoretic.   HENT:      Head: Normocephalic and atraumatic.      Right Ear: Tympanic membrane, ear canal and external ear normal.      Left Ear: Tympanic membrane, ear canal and external ear normal.      Ears:      Comments: Excessive cerumen bilaterally     Nose: Nose normal. No rhinorrhea.      Mouth/Throat:      Mouth: Mucous membranes are moist.      Pharynx: Oropharynx is clear. No posterior oropharyngeal erythema.   Eyes:      General: No scleral icterus.     Extraocular Movements: Extraocular movements intact.      Conjunctiva/sclera: Conjunctivae normal.      Pupils: Pupils are equal, round, and reactive to light.   Neck:      Vascular: No carotid bruit.   Cardiovascular:      Rate and Rhythm: Normal rate and regular rhythm.      Pulses: Normal pulses.           Dorsalis pedis pulses are 2+ on the right side and 2+ on the left side.        Posterior tibial pulses are 2+ on the right side and 2+ on the left side.      Heart sounds: Normal heart sounds. No murmur heard.     No gallop.   Pulmonary:      Effort: Pulmonary effort is normal.      Breath sounds: Normal breath sounds. No wheezing, rhonchi or rales.   Chest:      Chest wall: No tenderness.   Abdominal:      General: Bowel sounds are normal. There is no distension.      Palpations: Abdomen is soft. There is no mass.      Tenderness: There is no abdominal tenderness. There is no guarding or rebound.   Musculoskeletal:         General: No swelling, tenderness or deformity. Normal range of motion.      Cervical back: Normal range of motion. No rigidity.      Right lower leg: No " edema.      Left lower leg: No edema.   Lymphadenopathy:      Cervical: No cervical adenopathy.   Skin:     General: Skin is warm and dry.      Capillary Refill: Capillary refill takes less than 2 seconds.      Coloration: Skin is not pale.      Findings: No erythema or rash.   Neurological:      General: No focal deficit present.      Mental Status: She is alert and oriented to person, place, and time.      Cranial Nerves: No cranial nerve deficit.      Motor: No weakness.      Coordination: Coordination normal.      Gait: Gait normal.   Psychiatric:         Attention and Perception: Attention and perception normal.         Mood and Affect: Mood is anxious and depressed.         Speech: Speech normal.         Behavior: Behavior normal.         Thought Content: Thought content is not paranoid or delusional. Thought content does not include homicidal or suicidal ideation.         Cognition and Memory: Cognition and memory normal.         Judgment: Judgment normal.      Comments: Patient admits she is anxious about her health problems, and while she adamantly denies clinical depression, she clearly appears somewhat depressed and anhedonic, does admit to some seasonal affective disorder                         Assessment and Plan   Diagnoses and all orders for this visit:    1. Encounter for general adult medical examination with abnormal findings (Primary)  Healthy lifestyle measures including healthy diet regular exercise were discussed, and fortunately the patient is already doing those things and was praised for this.  Preventive healthcare measures were also discussed, and we gave her a Prevnar 20 today, and I gave her a prescription for RSV vaccine to get the pharmacy.  She will continue current medications and continue following up with her specialists, and I will check labs.  2. Other fatigue  -     TSH+Free T4    3. Fibromyalgia    4. Non-seasonal allergic rhinitis due to pollen    5. Gastroesophageal reflux  disease without esophagitis  Well-controlled with current PPI therapy and she takes the lowest effective dose  6. High risk medication use  -     CBC & Differential  -     Comprehensive Metabolic Panel  -     Magnesium    7. Macular degeneration, unspecified laterality, unspecified type  She is followed by ophthalmology for this  8. Mixed hyperlipidemia  -     Lipid Panel    9. Polyneuropathy  -     Vitamin B12  -     Folate  She is being followed by neurology for this and gets by okay with gabapentin, but thinks that her discomfort has worsened so I will recheck some labs including B12 and folic acid today.  10. Vitamin D deficiency  -     Vitamin D,25-Hydroxy    11. Blood glucose elevated  -     Hemoglobin A1c  This has been seen with labs in the past but never in the diabetes range  12. Seasonal affective disorder  While the patient has some anxiety about her health and some chronic symptoms of fatigue and anhedonia, she denies depression and does not wish to take antidepressant therapy, even though we have offered to try this in the past to see if it would help with her fibromyalgia or chronic pain issues.  She does admit to having seasonal affect of disorder and we discussed light therapy, pros and cons, the need to use 10,000 lm with a screening that is within 2 feet of the face at an angle for at least 30 minutes first thing in the morning, and she says she may give that a try.  If the patient does become more convinced that she has clinical depression over time then she can follow-up and discuss treatment options.  However, she says that at this time the stress in her life is managed by spiritual pursuits and actively engaging in her Faith yuri, and she was praised for doing that  Since the patient had excessive cerumen in both ears, we initially agreed to try removing the cerumen, but the patient's ears were markedly sensitive and she did not tolerate this with even brief intervention, so it was  decided to stop and let her try Murine earwax removal kit at home.  If the problem worsens she can call for ENT consultation so they can use a suction device.  Other orders  -     Pneumococcal Conjugate Vaccine 20-Valent (PCV20)  -     Cancel: Ear Cerumen Removal             Follow Up   Return in about 1 year (around 10/31/2024) for Annual physical, Nurse - AWV Subsequent.  Patient was given instructions and counseling regarding her condition or for health maintenance advice. Please see specific information pulled into the AVS if appropriate.

## 2023-11-01 LAB
25(OH)D3+25(OH)D2 SERPL-MCNC: 76 NG/ML (ref 30–100)
ALBUMIN SERPL-MCNC: 4.7 G/DL (ref 3.8–4.8)
ALBUMIN/GLOB SERPL: 1.9 {RATIO} (ref 1.2–2.2)
ALP SERPL-CCNC: 48 IU/L (ref 44–121)
ALT SERPL-CCNC: 9 IU/L (ref 0–32)
AST SERPL-CCNC: 16 IU/L (ref 0–40)
BASOPHILS # BLD AUTO: 0.1 X10E3/UL (ref 0–0.2)
BASOPHILS NFR BLD AUTO: 1 %
BILIRUB SERPL-MCNC: 0.4 MG/DL (ref 0–1.2)
BUN SERPL-MCNC: 9 MG/DL (ref 8–27)
BUN/CREAT SERPL: 11 (ref 12–28)
CALCIUM SERPL-MCNC: 9.5 MG/DL (ref 8.7–10.3)
CHLORIDE SERPL-SCNC: 97 MMOL/L (ref 96–106)
CHOLEST SERPL-MCNC: 247 MG/DL (ref 100–199)
CO2 SERPL-SCNC: 23 MMOL/L (ref 20–29)
CREAT SERPL-MCNC: 0.79 MG/DL (ref 0.57–1)
EGFRCR SERPLBLD CKD-EPI 2021: 77 ML/MIN/1.73
EOSINOPHIL # BLD AUTO: 0.1 X10E3/UL (ref 0–0.4)
EOSINOPHIL NFR BLD AUTO: 1 %
ERYTHROCYTE [DISTWIDTH] IN BLOOD BY AUTOMATED COUNT: 12.4 % (ref 11.7–15.4)
FOLATE SERPL-MCNC: 17 NG/ML
GLOBULIN SER CALC-MCNC: 2.5 G/DL (ref 1.5–4.5)
GLUCOSE SERPL-MCNC: 90 MG/DL (ref 70–99)
HBA1C MFR BLD: 5.9 % (ref 4.8–5.6)
HCT VFR BLD AUTO: 37.1 % (ref 34–46.6)
HDLC SERPL-MCNC: 85 MG/DL
HGB BLD-MCNC: 12.7 G/DL (ref 11.1–15.9)
IMM GRANULOCYTES # BLD AUTO: 0 X10E3/UL (ref 0–0.1)
IMM GRANULOCYTES NFR BLD AUTO: 0 %
LDLC SERPL CALC-MCNC: 147 MG/DL (ref 0–99)
LYMPHOCYTES # BLD AUTO: 1.6 X10E3/UL (ref 0.7–3.1)
LYMPHOCYTES NFR BLD AUTO: 24 %
MAGNESIUM SERPL-MCNC: 2.1 MG/DL (ref 1.6–2.3)
MCH RBC QN AUTO: 31.1 PG (ref 26.6–33)
MCHC RBC AUTO-ENTMCNC: 34.2 G/DL (ref 31.5–35.7)
MCV RBC AUTO: 91 FL (ref 79–97)
MONOCYTES # BLD AUTO: 0.7 X10E3/UL (ref 0.1–0.9)
MONOCYTES NFR BLD AUTO: 10 %
NEUTROPHILS # BLD AUTO: 4.2 X10E3/UL (ref 1.4–7)
NEUTROPHILS NFR BLD AUTO: 64 %
PLATELET # BLD AUTO: 283 X10E3/UL (ref 150–450)
POTASSIUM SERPL-SCNC: 4.7 MMOL/L (ref 3.5–5.2)
PROT SERPL-MCNC: 7.2 G/DL (ref 6–8.5)
RBC # BLD AUTO: 4.09 X10E6/UL (ref 3.77–5.28)
SODIUM SERPL-SCNC: 135 MMOL/L (ref 134–144)
T4 FREE SERPL-MCNC: 1.26 NG/DL (ref 0.82–1.77)
TRIGL SERPL-MCNC: 91 MG/DL (ref 0–149)
TSH SERPL DL<=0.005 MIU/L-ACNC: 1.2 UIU/ML (ref 0.45–4.5)
VIT B12 SERPL-MCNC: 1342 PG/ML (ref 232–1245)
VLDLC SERPL CALC-MCNC: 15 MG/DL (ref 5–40)
WBC # BLD AUTO: 6.7 X10E3/UL (ref 3.4–10.8)

## 2023-12-27 DIAGNOSIS — G62.9 POLYNEUROPATHY: ICD-10-CM

## 2023-12-27 NOTE — TELEPHONE ENCOUNTER
Rx Refill Note  Requested Prescriptions     Pending Prescriptions Disp Refills    gabapentin (NEURONTIN) 100 MG capsule 120 capsule 5     Sig: Take 1 capsule in the morning and 3 capsules at night      Last filled: 6/26/23 30 days with 5 refills    Last office visit with prescribing clinician: 6/26/2023      Next office visit with prescribing clinician: 1/15/2024     Jose Muhammad MA  12/27/23, 13:33 EST

## 2023-12-27 NOTE — TELEPHONE ENCOUNTER
Medication requested (name and dose):    GABAPENTIN 100 MG CAPSULE  TAKE 1 CAPSULE IN MORNING AND 3 CAPSULES AT NIGHT    Pharmacy where request should be sent:      [address] [phone] [facility] [phone]        Additional details provided by patient:  PATIENT DOES NOT HAVE ENOUGH MEDICATION TO LAST UNTIL NEXT OV 1/15/24 - ASKING FOR MEDICATION UNTIL SHE CAN BE SEEN.    Best call back number:  [phone]    Does the patient have less than a 3 day supply:  [x] Yes  [] No    Rose Marie Zambrano Rep  12/27/23, 11:20 EST

## 2023-12-28 RX ORDER — GABAPENTIN 100 MG/1
CAPSULE ORAL
Qty: 120 CAPSULE | Refills: 0 | Status: SHIPPED | OUTPATIENT
Start: 2023-12-28

## 2023-12-28 NOTE — TELEPHONE ENCOUNTER
I have reviewed Dr. Irene's last OV note and PDMP, I have sent in Gabapentin refill, next appointment with Dr. Irene is on 1/15/2024, thanks.

## 2024-01-05 ENCOUNTER — OFFICE VISIT (OUTPATIENT)
Dept: FAMILY MEDICINE CLINIC | Facility: CLINIC | Age: 79
End: 2024-01-05
Payer: MEDICARE

## 2024-01-05 VITALS
OXYGEN SATURATION: 97 % | WEIGHT: 148 LBS | HEIGHT: 64 IN | DIASTOLIC BLOOD PRESSURE: 68 MMHG | BODY MASS INDEX: 25.27 KG/M2 | SYSTOLIC BLOOD PRESSURE: 122 MMHG | HEART RATE: 66 BPM

## 2024-01-05 DIAGNOSIS — M25.552 HIP PAIN, BILATERAL: Primary | ICD-10-CM

## 2024-01-05 DIAGNOSIS — R51.9 ACUTE NONINTRACTABLE HEADACHE, UNSPECIFIED HEADACHE TYPE: ICD-10-CM

## 2024-01-05 DIAGNOSIS — M25.551 HIP PAIN, BILATERAL: Primary | ICD-10-CM

## 2024-01-05 DIAGNOSIS — G47.30 SLEEP APNEA, UNSPECIFIED TYPE: ICD-10-CM

## 2024-01-05 DIAGNOSIS — M54.2 NECK PAIN: ICD-10-CM

## 2024-01-05 PROCEDURE — 99213 OFFICE O/P EST LOW 20 MIN: CPT | Performed by: PHYSICIAN ASSISTANT

## 2024-01-05 RX ORDER — OLOPATADINE HYDROCHLORIDE AND MOMETASONE FUROATE 25; 665 UG/1; UG/1
SPRAY, METERED NASAL
COMMUNITY
Start: 2023-11-21

## 2024-01-05 NOTE — PROGRESS NOTES
"Chief Complaint  Hip Pain (Pt reports she has had soreness in rt hip and the pain has increased recently. ), Fatigue (Pt reports always feeling tired all the time, but thinks maybe that she is having some sleep apnea. ), and Headache (When doing exercises and has any straining, she is noticing more frequent headaches. )    Subjective          History of Present Illness  Millie De La Fuente is here today with her headache and hip pain    Patient states that her right hip has been bothering her more over the past couple of weeks.  She states for the past few days it has been worse.  She states that it really started hurting her on 19 December.  She denies any accidents or falls.  She does have an appointment with the bone specialist at  on the 19th to check up on her osteopenia.  Patient states it has been getting more more painful to get in and out of the car she now has 2 rotate her whole body around rather than bringing her leg into the car.  She states that she is noticed really both hips been hurting but that her right hip seems to hurt more than her left.    Patient states that she has headaches when she is laying down.  She states that these headaches are really a flash of pain across her forehead.  She states these last very briefly.  She states that she has pain with pushing or pulling and they last only a few seconds.  She denies any nausea or sinus pain or pressure.    Patient states that her brother sent her an oxygen sensor for her finger a few weeks ago.  She notes that it shows that her oxygen levels dropped to the 80s during the middle the night.  Patient states that she is unknown about whether or not she has any snoring.  She does wake a lot during the night.  She states that she is always had a lot of fatigue.  She is concerned that she has sleep apnea.      Objective   Vital Signs:   /68   Pulse 66   Ht 162.6 cm (64\")   Wt 67.1 kg (148 lb)   SpO2 97%   BMI 25.40 kg/m²     Body mass index is " 25.4 kg/m².         Review of Systems      Current Outpatient Medications:   •  acetaminophen (TYLENOL) 500 MG tablet, Take 400 mg by mouth Every 6 (Six) Hours As Needed for Mild Pain ., Disp: , Rfl:   •  Calcium Carbonate-Vitamin D (calcium-vitamin D) 500-200 MG-UNIT tablet per tablet, , Disp: , Rfl:   •  cyanocobalamin (VITAMIN B-12) 1000 MCG tablet, , Disp: , Rfl:   •  denosumab (PROLIA) 60 MG/ML solution prefilled syringe syringe, Inject  under the skin into the appropriate area as directed 1 (One) Time., Disp: , Rfl:   •  EPINEPHrine (EPIPEN) 0.3 MG/0.3ML solution auto-injector injection, INJECT 1 PEN INTRAMUSCULARLY ONCE AS DIRECTED FOR ALLERGIC REACTION, Disp: , Rfl:   •  gabapentin (NEURONTIN) 100 MG capsule, Take 1 capsule in the morning and 3 capsules at night, Disp: 120 capsule, Rfl: 0  •  Multiple Vitamins-Minerals (PRESERVISION AREDS 2 PO), take one tablet po qd, Disp: , Rfl:   •  omeprazole (priLOSEC) 40 MG capsule, TAKE 1 CAPSULE BY MOUTH ONCE DAILY BEFORE A MEAL, Disp: 90 capsule, Rfl: 3  •  Ryaltris 665-25 MCG/ACT suspension, INSTILL 2 SPRAYS IN EACH NOSTRIL TWICE DAILY, Disp: , Rfl:     Allergies: Codeine, Erythromycin, Clindamycin/lincomycin, Sulfa antibiotics, and Penicillins    Physical Exam  Vitals and nursing note reviewed.   Constitutional:       General: She is not in acute distress.     Appearance: Normal appearance. She is normal weight. She is not ill-appearing, toxic-appearing or diaphoretic.   HENT:      Head: Normocephalic and atraumatic.      Right Ear: Tympanic membrane, ear canal and external ear normal.      Left Ear: Tympanic membrane, ear canal and external ear normal.      Nose: Nose normal.      Mouth/Throat:      Mouth: Mucous membranes are moist.   Eyes:      Pupils: Pupils are equal, round, and reactive to light.   Cardiovascular:      Rate and Rhythm: Normal rate and regular rhythm.      Heart sounds: Normal heart sounds.   Pulmonary:      Effort: Pulmonary effort is  normal.      Breath sounds: Normal breath sounds.   Musculoskeletal:        Legs:    Neurological:      Mental Status: She is alert. Mental status is at baseline.   Psychiatric:         Mood and Affect: Mood normal.        Result Review :                   Assessment and Plan    Diagnoses and all orders for this visit:    1. Hip pain, bilateral (Primary)  -     XR Hips Bilateral With or Without Pelvis 2 View; Future  Will obtain x-rays for further evaluation.  2. Neck pain  -     XR Spine Cervical Complete 4 or 5 View (In Office)  We will obtain x-rays for further evaluation.  Due to the brief nature of the pain that floats across her forehead this may be more of a nerve issues that we will evaluate for that.  3. Acute nonintractable headache, unspecified headache type  -     XR Spine Cervical Complete 4 or 5 View (In Office)    4. Sleep apnea, unspecified type  -     Ambulatory Referral to Sleep Medicine  Will also send her for sleep medicine referral.      Follow Up   No follow-ups on file.  Patient was given instructions and counseling regarding her condition or for health maintenance advice. Please see specific information pulled into the AVS if appropriate.     MIRIAM Tabares  01/05/2024

## 2024-01-08 NOTE — PROGRESS NOTES
Please all the patient and let her know that she has degenerative disk disease as well as arthropathy which can lead to pain. The xray further states that this is not unstable. Happy to send her on to neurosurgery if she would like further eval or can also be seen by arthritis specialist

## 2024-01-09 ENCOUNTER — TELEPHONE (OUTPATIENT)
Dept: FAMILY MEDICINE CLINIC | Facility: CLINIC | Age: 79
End: 2024-01-09
Payer: MEDICARE

## 2024-01-15 ENCOUNTER — HOSPITAL ENCOUNTER (OUTPATIENT)
Dept: GENERAL RADIOLOGY | Facility: HOSPITAL | Age: 79
Discharge: HOME | End: 2024-01-15
Admitting: PSYCHIATRY & NEUROLOGY
Payer: MEDICARE

## 2024-01-15 ENCOUNTER — OFFICE VISIT (OUTPATIENT)
Dept: NEUROLOGY | Facility: CLINIC | Age: 79
End: 2024-01-15
Payer: MEDICARE

## 2024-01-15 VITALS
WEIGHT: 142 LBS | BODY MASS INDEX: 24.24 KG/M2 | DIASTOLIC BLOOD PRESSURE: 60 MMHG | HEART RATE: 72 BPM | HEIGHT: 64 IN | SYSTOLIC BLOOD PRESSURE: 102 MMHG | OXYGEN SATURATION: 97 %

## 2024-01-15 DIAGNOSIS — M79.671 PAIN OF RIGHT HEEL: ICD-10-CM

## 2024-01-15 DIAGNOSIS — G62.9 POLYNEUROPATHY: Primary | ICD-10-CM

## 2024-01-15 DIAGNOSIS — G62.9 POLYNEUROPATHY: ICD-10-CM

## 2024-01-15 PROCEDURE — 73630 X-RAY EXAM OF FOOT: CPT

## 2024-01-15 PROCEDURE — 1160F RVW MEDS BY RX/DR IN RCRD: CPT | Performed by: PSYCHIATRY & NEUROLOGY

## 2024-01-15 PROCEDURE — 1159F MED LIST DOCD IN RCRD: CPT | Performed by: PSYCHIATRY & NEUROLOGY

## 2024-01-15 PROCEDURE — 99214 OFFICE O/P EST MOD 30 MIN: CPT | Performed by: PSYCHIATRY & NEUROLOGY

## 2024-01-15 RX ORDER — GABAPENTIN 100 MG/1
CAPSULE ORAL
Qty: 120 CAPSULE | Refills: 6 | Status: SHIPPED | OUTPATIENT
Start: 2024-01-15

## 2024-01-15 RX ORDER — GABAPENTIN 100 MG/1
CAPSULE ORAL
Qty: 120 CAPSULE | Refills: 6 | Status: SHIPPED | OUTPATIENT
Start: 2024-01-15 | End: 2024-01-15 | Stop reason: SDUPTHER

## 2024-01-15 NOTE — TELEPHONE ENCOUNTER
[facility] pharmacy called and said there was an error with the electronic sending and can we resend in her GBP script.    I am going to have Maria Victoria approve this again and if there are any issues the pharmacist is going to call my direct line back and at that time I'll try doing a verbal but wants to see if it just happened with this one as instructions and everything else remained the same.

## 2024-01-15 NOTE — PROGRESS NOTES
Subjective:    CC: Millie De La Fuente is seen today  for Polyneuropathy         Current visit-patient states she has had more pain in the bottoms of her feet in addition to having excruciating pain in her right heel which is further worsened when she puts pressure on that foot like while driving.  She continues to take gabapentin 400 mg nightly.  Her last A1c was 5.9, LDL was 147, B12 was 1342 and vitamin D was 101.    Last visit-patient states she has recently had more pain in her feet/legs as she has been walking more (about 1-1/2 miles daily).  Her last A1c was extremely well controlled at 5.5.  B12 level was 1192, LDL was 121.  She continues to take gabapentin 300 mg nightly.      Last visit-patient states that the paresthesias in her feet are much better controlled since she increased her gabapentin now at 300 mg nightly.  She cannot take a higher dose as she already feels slightly off balance while walking to the bathroom in the middle of the night.  She also continues to take vitamin B12 supplements and has been making dietary modifications for her prediabetes.  Denies any balance problems.  Has yet to start Prolia shots for her arthritis.      Last visit-patient states that the pain in her feet is well controlled with gabapentin 200 mg at night however she continues to have myalgias and arthralgias.  She was taking ibuprofen frequently but has cut back on her use.  May take an additional tablet of gabapentin at night as needed.  Tried Cymbalta but it made her very sick.  Her sodium level has improved to 138 since she cut back on her ibuprofen.  Blood glucose was still slightly elevated at 110.  She will be starting Prolia soon.      Last visit-patient states that both the pain in her feet and her arthralgias remain well controlled with gabapentin.  She does have occasional sharp pains in her soles which she attributes to plantar fasciitis (for which she is wearing insoles).  She mostly takes a dose of 200 mg at  night but when her feet bother her too much she takes 300 mg.  She has stopped feeling of balance after reducing the dose.  Denies having any major arthralgias now but does have intense fatigue as she had Covid in February.  Her sodium level was low at the time (126) and she has not had that rechecked.    Last visit-patient states that she feels a lot better after starting gabapentin now at 300 mg nightly as her feet have stopped hurting.  However she has felt slightly off balance and lightheaded on standing up and walking occasionally.  In fact she sustained a fall last week hurting her left foot.  Has not checked her blood pressure to make sure that it is running within normal limits as her blood pressure machine is broken.    Last visit-since her last visit patient had her EMG/NCS that showed mild peripheral neuropathy.  She also had blood work which was normal other than slightly high A1c of 5.7.  Today she tells me that she tried low doses of Cymbalta for years ago for fibromyalgia but had side effects of dizziness however was coming off of her antibiotics for an infection at the time so does not know if the side effects were caused by the antibiotics or the Cymbalta.  She continues to have burning in her feet as well as generalized myalgias.    Initial visit- 74-year-old female with a history of GERD, fibromyalgia, hyperlipidemia presents with diffuse pain in her hands and feet.  As per patient she started having burning pain in the bottoms of her feet as well as numbness about 20 years ago but over time her symptoms have progressed into her legs as well as her hands.  She states her arms are very sensitive to touch and wearing something tight can hurt a lot.  She also has diffuse pain in her joints including her shoulders and hips.  Was diagnosed with fibromyalgia several years ago but was never started on any medications as she was afraid of the side effects.  The diffuse pain is now affecting the quality of  her life as she does not take part in several activities such as swimming anymore.  She denies having any history of diabetes, heavy alcohol intake or family history of neuropathy.  She grew up in [place] and took part in a lot of winter activities which she feels may have brought on the neuropathy.  Has had brain scans in the past that ruled out multiple sclerosis.    The following portions of the patient's history were reviewed today and updated as of 06/26/2020  : allergies, current medications, past family history, past medical history, past social history, past surgical history and problem list  These document will be scanned to patient's chart.      Current Outpatient Medications:     acetaminophen (TYLENOL) 500 MG tablet, Take 400 mg by mouth Every 6 (Six) Hours As Needed for Mild Pain ., Disp: , Rfl:     Calcium Carbonate-Vitamin D (calcium-vitamin D) 500-200 MG-UNIT tablet per tablet, , Disp: , Rfl:     cyanocobalamin (VITAMIN B-12) 1000 MCG tablet, , Disp: , Rfl:     denosumab (PROLIA) 60 MG/ML solution prefilled syringe syringe, Inject  under the skin into the appropriate area as directed 1 (One) Time., Disp: , Rfl:     EPINEPHrine (EPIPEN) 0.3 MG/0.3ML solution auto-injector injection, INJECT 1 PEN INTRAMUSCULARLY ONCE AS DIRECTED FOR ALLERGIC REACTION, Disp: , Rfl:     gabapentin (NEURONTIN) 100 MG capsule, Take 1 capsule in the morning and 3 capsules at night, Disp: 120 capsule, Rfl: 6    Multiple Vitamins-Minerals (PRESERVISION AREDS 2 PO), take one tablet po qd, Disp: , Rfl:     omeprazole (priLOSEC) 40 MG capsule, TAKE 1 CAPSULE BY MOUTH ONCE DAILY BEFORE A MEAL, Disp: 90 capsule, Rfl: 3    Ryaltris 665-25 MCG/ACT suspension, INSTILL 2 SPRAYS IN EACH NOSTRIL TWICE DAILY, Disp: , Rfl:    Past Medical History:   Diagnosis Date    Allergic     I don’t remember the first time it was recorded    Allergic rhinitis due to pollen     Atypical facial pain     syndrome with neg eurologic workup including MRI     Chronic vertigo     neurologic and ENT workup    Coxarthrosis     primary, bilateral    Diverticulosis     Fibromyalgia     Fibromyalgia, primary 2002    None    Gastroesophageal reflux disease without esophagitis     GERD (gastroesophageal reflux disease)     History of drug dependence     Hyperlipidemia     Injury of kidney     Irritable bowel disease     Macular degeneration     Macular disorder     degenerative disorder of macula    Migraine     occipital migraine, has had neuologi evaluation    Mixed hyperlipidemia     Osteoarthritis     senile    Osteopenia     Osteoporosis     Peripheral neuropathy     Polyneuropathy     Shingles     Vitamin D deficiency       Past Surgical History:   Procedure Laterality Date    APPENDECTOMY      COLONOSCOPY  2016    EYE SURGERY      HYSTERECTOMY      SHOULDER SURGERY      TONSILLECTOMY        Family History   Problem Relation Age of Onset    Hypertension Mother     Stroke Mother     Hyperlipidemia Mother     Alzheimer's disease Mother     Heart failure Father     Congenital heart disease Father     Breast cancer Sister 57    Aortic aneurysm Brother         abdominal    Alcohol abuse Other         family history      Social History     Socioeconomic History    Marital status:    Tobacco Use    Smoking status: Former     Packs/day: 1.00     Years: 27.00     Additional pack years: 0.00     Total pack years: 27.00     Types: Cigarettes     Start date: 1965     Quit date: 1992     Years since quittin.0     Passive exposure: Past    Smokeless tobacco: Never    Tobacco comments:     I quit several times and was a closet smoker for the last 10 years   Vaping Use    Vaping Use: Never used   Substance and Sexual Activity    Alcohol use: Not Currently     Comment: stopped drinking in     Drug use: Never    Sexual activity: Never     Review of Systems   Musculoskeletal:  Positive for arthralgias.   Neurological:  Positive for numbness.   All other systems  "reviewed and are negative.      Objective:    /60   Pulse 72   Ht 162.6 cm (64\")   Wt 64.4 kg (142 lb)   SpO2 97%   BMI 24.37 kg/m²     Neurology Exam:    General apperance: NAD.  Tenderness to palpation of the right heel    Mental status: Alert, awake and oriented to time place and person.    Recent and Remote memory: Intact.    Attention span and Concentration: Normal.     Language and Speech: Intact- No dysarthria.    Fluency, Naming , Repitition and Comprehension:  Intact    Cranial Nerves:   CN II: Visual fields are full. Intact. Fundi - Normal, No papillederma, Pupils - PAZ  CN III, IV and VI: Extraocular movements are intact. Normal saccades.   CN V: Facial sensation is intact.   CN VII: Muscles of facial expression reveal no asymmetry. Intact.   CN VIII: Hearing is intact. Whispered voice intact.   CN IX and X: Palate elevates symmetrically. Intact  CN XI: Shoulder shrug is intact.   CN XII: Tongue is midline without evidence of atrophy or fasciculation.     Ophthalmoscopic exam of optic disc -deferred    Motor:  Right UE muscle strength 5/5. Normal tone.     Left UE muscle strength 5/5. Normal tone.      Right LE muscle strength5/5. Normal tone.     Left LE muscle strength 5/5. Normal tone.      Sensory: Mildly reduced to pinprick in both feet to level of ankles as well as slightly reduced vibration.  She had hyperesthesia to light touch in upper extremities    DTRs: 2+ bilaterally in upper and lower extremities.    Babinski: Negative bilaterally.    Co-ordination: Normal finger-to-nose, heel to shin B/L.    Rhomberg: Negative.    Gait: Normal.    Cardiovascular: Regular rate and rhythm without murmur, gallop or rub.    Assessment and Plan:    1. Polyneuropathy  She most likely has idiopathic neuropathy.  EMG/NCS showed mild neuropathy.   Last A1c was normal at 5.9.  I have told her to make dietary modifications  She is currently on gabapentin 400 mg nightly    2.  Right heel pain  I will get " an x-ray of the right foot/heel to rule out a bony spur  I have counseled her to use an anti-inflammatory gel such as Bengay     3.  Hyperlipidemia  Both her total cholesterol and LDL were elevated.  She should speak to her PCP about starting a low-dose statin    Return in about 8 months (around 9/15/2024).     I spent 23 minutes out of which 20 minutes were spent face-to-face    Madonna Irene MD

## 2024-01-16 ENCOUNTER — TELEPHONE (OUTPATIENT)
Dept: NEUROLOGY | Facility: CLINIC | Age: 79
End: 2024-01-16
Payer: MEDICARE

## 2024-01-16 NOTE — TELEPHONE ENCOUNTER
----- Message from Madonna Irene MD sent at 1/16/2024  1:53 PM EST -----  Can you please tell the patient that her x-ray of the foot did not show any mild bony growth in the heel as well as mild arthritis of the foot.  She should carry out conservative management by icing her heel, rest, using Bengay and taking ibuprofen as needed for the next 10 days but if that does not help she can let me know and I can send in a referral to an orthopedic surgeon

## 2024-01-16 NOTE — TELEPHONE ENCOUNTER
Verified with  that patient does have bone spur and mild arthritis.  Notified patient and she was in good understanding.  She will let us know how she is doing in about 10 days or sooner if needed.

## 2024-01-18 ENCOUNTER — OFFICE VISIT (OUTPATIENT)
Dept: SLEEP MEDICINE | Facility: HOSPITAL | Age: 79
End: 2024-01-18
Payer: MEDICARE

## 2024-01-18 VITALS
BODY MASS INDEX: 25.23 KG/M2 | WEIGHT: 147.8 LBS | DIASTOLIC BLOOD PRESSURE: 65 MMHG | SYSTOLIC BLOOD PRESSURE: 144 MMHG | OXYGEN SATURATION: 98 % | HEIGHT: 64 IN | HEART RATE: 73 BPM

## 2024-01-18 DIAGNOSIS — G47.33 OBSTRUCTIVE SLEEP APNEA, ADULT: ICD-10-CM

## 2024-01-18 DIAGNOSIS — G47.19 EXCESSIVE DAYTIME SLEEPINESS: Primary | ICD-10-CM

## 2024-01-18 NOTE — PROGRESS NOTES
"  Millie De La Fuente is a 78 y.o. female.   Chief Complaint   Patient presents with    Sleeping Problem       HPI     78 y.o. female seen in consultation at the request of Chasity Madera PA for evaluation of the above.     She describes frequent nocturnal awakenings.  These can #5-8 times per night on average.  She averages 8 hours of sleep on average.  She keeps a regular sleep schedule.  It takes around 30-60 minutes to initiate sleep.    She describes her energy level as \"low\" during the day.  She obtained a home oximeter that will record through the night and this was significantly abnormal with low oxygen saturations through good portions of the night.    Her  has not commented on snoring but she says that he would not notice it even if she did since he takes his hearing aids out at night and has difficulty hearing.  She does awaken with a dry mouth and has had nocturnal reflux.    She has no significant parasomnias.  She denies consistent RLS type symptoms that impair her sleep.    Descanso Scale is: 5/24    The patient's relevant past medical, surgical, family, and social history reviewed and updated in Epic as appropriate.    Current medications are:   Current Outpatient Medications:     acetaminophen (TYLENOL) 500 MG tablet, Take 400 mg by mouth Every 6 (Six) Hours As Needed for Mild Pain ., Disp: , Rfl:     Calcium Carbonate-Vitamin D (calcium-vitamin D) 500-200 MG-UNIT tablet per tablet, , Disp: , Rfl:     cyanocobalamin (VITAMIN B-12) 1000 MCG tablet, , Disp: , Rfl:     denosumab (PROLIA) 60 MG/ML solution prefilled syringe syringe, Inject  under the skin into the appropriate area as directed 1 (One) Time., Disp: , Rfl:     EPINEPHrine (EPIPEN) 0.3 MG/0.3ML solution auto-injector injection, INJECT 1 PEN INTRAMUSCULARLY ONCE AS DIRECTED FOR ALLERGIC REACTION, Disp: , Rfl:     gabapentin (NEURONTIN) 100 MG capsule, Take 1 capsule in the morning and 3 capsules at night, Disp: 120 capsule, Rfl: 6    " "Multiple Vitamins-Minerals (PRESERVISION AREDS 2 PO), take one tablet po qd, Disp: , Rfl:     omeprazole (priLOSEC) 40 MG capsule, TAKE 1 CAPSULE BY MOUTH ONCE DAILY BEFORE A MEAL, Disp: 90 capsule, Rfl: 3    Ryaltris 665-25 MCG/ACT suspension, INSTILL 2 SPRAYS IN EACH NOSTRIL TWICE DAILY, Disp: , Rfl: .    Review of Systems    Review of Systems  ROS documented in patient questionnaire ×14 systems.  Reviewed with patient.  Otherwise negative except as noted in HPI.    Physical Exam    Blood pressure 144/65, pulse 73, height 162.6 cm (64\"), weight 67 kg (147 lb 12.8 oz), SpO2 98%, not currently breastfeeding. Body mass index is 25.37 kg/m².    Physical Exam  Vitals and nursing note reviewed.   Constitutional:       Appearance: Normal appearance. She is well-developed.   HENT:      Head: Normocephalic and atraumatic.      Nose: Nose normal.      Mouth/Throat:      Mouth: Mucous membranes are moist.      Pharynx: Oropharynx is clear. No oropharyngeal exudate.      Comments: Class III airway  Eyes:      General: No scleral icterus.     Conjunctiva/sclera: Conjunctivae normal.   Neck:      Thyroid: No thyromegaly.      Trachea: No tracheal deviation.   Cardiovascular:      Rate and Rhythm: Normal rate and regular rhythm.      Heart sounds: No murmur heard.     No friction rub. No gallop.   Pulmonary:      Effort: Pulmonary effort is normal. No respiratory distress.      Breath sounds: No wheezing or rales.   Musculoskeletal:         General: No deformity. Normal range of motion.   Skin:     General: Skin is warm and dry.      Findings: No rash.   Neurological:      Mental Status: She is alert and oriented to person, place, and time.   Psychiatric:         Behavior: Behavior normal.         Thought Content: Thought content normal.         DATA:    Reviewed 1/5/2024 note from MIRIAM Tabares    Reviewed most recent lab studies dated 11/1/2023 including CBC and CMP    ASSESSMENT:    Problem List Items Addressed This Visit "    None  Visit Diagnoses       Excessive daytime sleepiness    -  Primary    Relevant Orders    Home Sleep Study    Obstructive sleep apnea, adult        Relevant Orders    Home Sleep Study            78-year-old female who presents to the sleep clinic with frequent nocturnal awakenings and nonrestorative sleep.  She is not sure if she snores as her  is very hard of hearing.  She does awaken with a dry mouth and has noted nocturnal reflux.  Her airway is class III.  Her risk of obstructive sleep apnea is elevated and I recommended further evaluation for the possible presence of obstructive sleep apnea and went over the diagnostic process and treatment options with her as outlined below.  She was agreeable to proceed.    PLAN:    Home sleep apnea testing.  The diagnostic process as well as potential treatment options for obstructive sleep apnea were discussed.  The cardiovascular and metabolic risks of untreated obstructive sleep apnea were reviewed.  The patient was amenable to a trial of CPAP therapy if deemed appropriate after testing complete.  Close sleep center follow-up.      I have reviewed the results of my evaluation and impression and discussed my recommendations in detail with the patient.    Signed by  Aristeo Cassidy MD    January 18, 2024      CC: Remi Morris MD Ackerman, Sara, PA

## 2024-01-29 ENCOUNTER — OFFICE VISIT (OUTPATIENT)
Dept: FAMILY MEDICINE CLINIC | Facility: CLINIC | Age: 79
End: 2024-01-29
Payer: MEDICARE

## 2024-01-29 ENCOUNTER — TELEPHONE (OUTPATIENT)
Dept: NEUROLOGY | Facility: CLINIC | Age: 79
End: 2024-01-29

## 2024-01-29 VITALS
HEIGHT: 64 IN | HEART RATE: 77 BPM | DIASTOLIC BLOOD PRESSURE: 72 MMHG | BODY MASS INDEX: 25.27 KG/M2 | WEIGHT: 148 LBS | SYSTOLIC BLOOD PRESSURE: 116 MMHG | OXYGEN SATURATION: 99 %

## 2024-01-29 DIAGNOSIS — R51.9 ACUTE NONINTRACTABLE HEADACHE, UNSPECIFIED HEADACHE TYPE: ICD-10-CM

## 2024-01-29 DIAGNOSIS — M16.0 OSTEOARTHRITIS OF BOTH HIPS, UNSPECIFIED OSTEOARTHRITIS TYPE: ICD-10-CM

## 2024-01-29 DIAGNOSIS — M25.551 HIP PAIN, BILATERAL: Primary | ICD-10-CM

## 2024-01-29 DIAGNOSIS — E78.2 MIXED HYPERLIPIDEMIA: ICD-10-CM

## 2024-01-29 DIAGNOSIS — M25.552 HIP PAIN, BILATERAL: Primary | ICD-10-CM

## 2024-01-29 RX ORDER — SODIUM FLUORIDE 1.1 G/100G
CREAM ORAL SEE ADMIN INSTRUCTIONS
COMMUNITY
Start: 2024-01-24

## 2024-01-29 NOTE — TELEPHONE ENCOUNTER
Provider: DR. ZOILA DANG    Caller: Millie De La Fuente    Relationship to Patient: Self    Phone Number: [phone]    Reason for Call: PT STATES SHE HAS HAD NEW ONSET OF HEADACHES FOR APPROXIMATELY 1 MONTH. PT SAW HER PCP TODAY AND WAS ADVISED TO CALL DR. DANG. PT DENIES ANY STROKE-LIKE SYMPTOMS SUCH AS SLURRED SPEECH, SUDDEN ONSET WEAKNESS, FACIAL DROOP. PT IS OPEN TO SOONER APPT TO DISCUSS NEW HEADACHE CONCERNS W/ DR. DANG.    When was the patient last seen: 1/15/2024    When is the patient's next appointment: 9/16/2024    When did it start: 1 MONTH AGO    Where is it located: [place]- PARTICULARLY ABOVE THE RIGHT EYE    Characteristics of symptom/severity: DULL, THROBBING PAIN; RATES AT IT'S WORST A 7/10 ON PAIN SCALE    Timing- Is it constant or intermittent: CONSTANT, DAILY HEADACHES    What makes it worse: BENDING OVER, EXERCISE    What makes it better: N/A    What therapies/medications have you tried: TYLENOL- PT STATES THIS VERY MINIMAL RELIEF    **PT AWARE TO REPORT TO THE NEAREST ED FOR FURTHER EVALUATION IN THE EVENT SHE BEGINS TO EXPERIENCE ANY STROKE-LIKE SYMPTOMS.    PLEASE REVIEW AND ADVISE.

## 2024-01-29 NOTE — PROGRESS NOTES
"Chief Complaint  Hyperlipidemia (Neurology recommending following up to discuss medication for high cholesterol.  ), Headache (Follow up), and Arthritis (Follow up on referral)    Subjective          History of Present Illness  Millie De La Fuente is here today with follow up  Patient states that she continues to have periodic headaches where she has a flashing across her forehead and flashing behind her eyes.  She states that she was recently seen by neurology and did not discuss with them her headaches.  She states that they have not improved with Tylenol.  She did have x-ray of her neck and found no stenosis.  Did however find some arthritis to the area.  She states that she continues to have pain in her hips as well as in her hands which is also secondary to arthritis.  She would like to be seen by rheumatology for this.  When she saw the neurologist they were concerned that her cholesterol was high.  Her last cholesterol was several months ago and we will have that lab redrawn to reevaluate.    Objective   Vital Signs:   /72   Pulse 77   Ht 162.6 cm (64\")   Wt 67.1 kg (148 lb)   SpO2 99%   BMI 25.40 kg/m²     Body mass index is 25.4 kg/m².         Review of Systems      Current Outpatient Medications:     acetaminophen (TYLENOL) 500 MG tablet, Take 400 mg by mouth Every 6 (Six) Hours As Needed for Mild Pain ., Disp: , Rfl:     Calcium Carbonate-Vitamin D (calcium-vitamin D) 500-200 MG-UNIT tablet per tablet, , Disp: , Rfl:     cyanocobalamin (VITAMIN B-12) 1000 MCG tablet, , Disp: , Rfl:     denosumab (PROLIA) 60 MG/ML solution prefilled syringe syringe, Inject  under the skin into the appropriate area as directed 1 (One) Time., Disp: , Rfl:     Denta 5000 Plus 1.1 % cream, See Admin Instructions., Disp: , Rfl:     EPINEPHrine (EPIPEN) 0.3 MG/0.3ML solution auto-injector injection, INJECT 1 PEN INTRAMUSCULARLY ONCE AS DIRECTED FOR ALLERGIC REACTION, Disp: , Rfl:     gabapentin (NEURONTIN) 100 MG capsule, " Take 1 capsule in the morning and 3 capsules at night, Disp: 120 capsule, Rfl: 6    Multiple Vitamins-Minerals (PRESERVISION AREDS 2 PO), take one tablet po qd, Disp: , Rfl:     omeprazole (priLOSEC) 40 MG capsule, TAKE 1 CAPSULE BY MOUTH ONCE DAILY BEFORE A MEAL, Disp: 90 capsule, Rfl: 3    Ryaltris 665-25 MCG/ACT suspension, INSTILL 2 SPRAYS IN EACH NOSTRIL TWICE DAILY, Disp: , Rfl:     methylPREDNISolone (MEDROL) 4 MG dose pack, Take as directed on package instructions., Disp: 1 each, Rfl: 0    Allergies: Codeine, Erythromycin, Clindamycin/lincomycin, Sulfa antibiotics, and Penicillins    Physical Exam  Vitals and nursing note reviewed.   Constitutional:       General: She is not in acute distress.     Appearance: Normal appearance. She is not ill-appearing, toxic-appearing or diaphoretic.   HENT:      Head: Normocephalic and atraumatic.   Cardiovascular:      Rate and Rhythm: Normal rate and regular rhythm.      Heart sounds: Normal heart sounds.   Pulmonary:      Effort: Pulmonary effort is normal.      Breath sounds: Normal breath sounds.   Neurological:      Mental Status: She is alert. Mental status is at baseline.   Psychiatric:         Mood and Affect: Mood normal.          Result Review :                   Assessment and Plan    Diagnoses and all orders for this visit:    1. Hip pain, bilateral (Primary)  -     Ambulatory Referral to Rheumatology    2. Osteoarthritis of both hips, unspecified osteoarthritis type  -     Ambulatory Referral to Rheumatology    3. Acute nonintractable headache, unspecified headache type  Patient to continue Tylenol and will have her call and make follow-up appointment with neurology to address her headaches.  4. Mixed hyperlipidemia  Will draw lipid lab panel.  For further evaluation.    Follow Up   Return in about 1 month (around 2/29/2024) for Annual Arturo .  Patient was given instructions and counseling regarding her condition or for health maintenance advice. Please see  specific information pulled into the AVS if appropriate.     MIRIAM Tabares  01/29/2024

## 2024-01-30 RX ORDER — METHYLPREDNISOLONE 4 MG/1
TABLET ORAL
Qty: 1 EACH | Refills: 0 | Status: SHIPPED | OUTPATIENT
Start: 2024-01-30

## 2024-02-01 DIAGNOSIS — G47.19 EXCESSIVE DAYTIME SLEEPINESS: Primary | ICD-10-CM

## 2024-02-01 DIAGNOSIS — G47.33 OBSTRUCTIVE SLEEP APNEA, ADULT: ICD-10-CM

## 2024-02-02 ENCOUNTER — LAB (OUTPATIENT)
Dept: FAMILY MEDICINE CLINIC | Facility: CLINIC | Age: 79
End: 2024-02-02
Payer: MEDICARE

## 2024-02-05 ENCOUNTER — TELEPHONE (OUTPATIENT)
Dept: SLEEP MEDICINE | Facility: HOSPITAL | Age: 79
End: 2024-02-05
Payer: MEDICARE

## 2024-02-08 ENCOUNTER — TELEPHONE (OUTPATIENT)
Dept: FAMILY MEDICINE CLINIC | Facility: CLINIC | Age: 79
End: 2024-02-08
Payer: MEDICARE

## 2024-02-08 NOTE — TELEPHONE ENCOUNTER
NILO    Pt returned call - said she would like to do her re-draw for A1C at her next visit on 3/18 due to needing other labs done @ that time. No callback necessary.

## 2024-02-15 ENCOUNTER — TELEPHONE (OUTPATIENT)
Dept: FAMILY MEDICINE CLINIC | Facility: CLINIC | Age: 79
End: 2024-02-15
Payer: MEDICARE

## 2024-03-06 ENCOUNTER — HOSPITAL ENCOUNTER (OUTPATIENT)
Dept: SLEEP MEDICINE | Facility: HOSPITAL | Age: 79
Discharge: HOME | End: 2024-03-06
Admitting: INTERNAL MEDICINE
Payer: MEDICARE

## 2024-03-06 VITALS — BODY MASS INDEX: 25.1 KG/M2 | WEIGHT: 147.05 LBS | HEIGHT: 64 IN

## 2024-03-06 DIAGNOSIS — G47.33 OBSTRUCTIVE SLEEP APNEA, ADULT: ICD-10-CM

## 2024-03-06 DIAGNOSIS — G47.19 EXCESSIVE DAYTIME SLEEPINESS: ICD-10-CM

## 2024-03-06 PROCEDURE — 95811 POLYSOM 6/>YRS CPAP 4/> PARM: CPT

## 2024-03-07 PROCEDURE — 95811 POLYSOM 6/>YRS CPAP 4/> PARM: CPT | Performed by: INTERNAL MEDICINE

## 2024-03-08 DIAGNOSIS — G47.33 OSA (OBSTRUCTIVE SLEEP APNEA): Primary | ICD-10-CM

## 2024-03-19 ENCOUNTER — OFFICE VISIT (OUTPATIENT)
Dept: FAMILY MEDICINE CLINIC | Facility: CLINIC | Age: 79
End: 2024-03-19
Payer: MEDICARE

## 2024-03-19 VITALS
HEIGHT: 64 IN | BODY MASS INDEX: 24.92 KG/M2 | OXYGEN SATURATION: 98 % | SYSTOLIC BLOOD PRESSURE: 128 MMHG | WEIGHT: 146 LBS | DIASTOLIC BLOOD PRESSURE: 66 MMHG | HEART RATE: 67 BPM

## 2024-03-19 DIAGNOSIS — G47.30 SLEEP APNEA, UNSPECIFIED TYPE: ICD-10-CM

## 2024-03-19 DIAGNOSIS — Z01.419 ENCOUNTER FOR GYNECOLOGICAL EXAMINATION: ICD-10-CM

## 2024-03-19 DIAGNOSIS — M54.2 NECK PAIN: ICD-10-CM

## 2024-03-19 DIAGNOSIS — M79.671 RIGHT FOOT PAIN: Primary | ICD-10-CM

## 2024-03-19 DIAGNOSIS — G62.9 POLYNEUROPATHY: ICD-10-CM

## 2024-03-19 NOTE — PROGRESS NOTES
"Chief Complaint  Sleep Apnea, Foot Pain (Rt foot pain, pain worsening. ), referral gyn (Pt would like referral to GYN for check up. ), and Headache (Still having HA but it has improved. )    Subjective          History of Present Illness  Millie De La Fuente is here today for a follow up  Patient states that she recently underwent a sleep study and has been diagnosed with sleep apnea.  She has had a follow-up and has been prescribed a CPAP and is to be outfitted by home health in [place].    Patient states she recently went on vacation and did a lot of walking.  She states at that point her right foot became very painful and was limping on it.  She then switch back some old shoes and is stretching her foot and is getting better.  She states that she is been taking Tylenol with some help.  She states she is unable to take ibuprofen.  She is to have a Prolia shot this afternoon and cannot take any prednisone.  She is interested in seeing a podiatrist as she is concerned this may be her heel spurs growing larger.  She has seen an orthopedist in the past who has placed her on inserts which she has been wearing.    Patient states that she would like to have a gynecological exam.  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  She would like to see a gynecologist in [place] preferably a woman.    Patient states that she went to see neurology who placed her on pills for her headaches but has not taken them.  She has a follow-up with them in April.  She states that she still has some flashes of pain with exercises.      Objective   Vital Signs:   /66   Pulse 67   Ht 162.6 cm (64\")   Wt 66.2 kg (146 lb)   SpO2 98%   BMI 25.06 kg/m²     Body mass index is 25.06 kg/m².         Review of Systems      Current Outpatient Medications:     acetaminophen (TYLENOL) 500 MG tablet, Take 400 mg by mouth Every 6 (Six) Hours As Needed for Mild Pain ., Disp: , Rfl:     Calcium Carbonate-Vitamin D (calcium-vitamin D) " 500-200 MG-UNIT tablet per tablet, , Disp: , Rfl:     cyanocobalamin (VITAMIN B-12) 1000 MCG tablet, , Disp: , Rfl:     denosumab (PROLIA) 60 MG/ML solution prefilled syringe syringe, Inject  under the skin into the appropriate area as directed 1 (One) Time., Disp: , Rfl:     Denta 5000 Plus 1.1 % cream, See Admin Instructions., Disp: , Rfl:     EPINEPHrine (EPIPEN) 0.3 MG/0.3ML solution auto-injector injection, INJECT 1 PEN INTRAMUSCULARLY ONCE AS DIRECTED FOR ALLERGIC REACTION, Disp: , Rfl:     gabapentin (NEURONTIN) 100 MG capsule, Take 1 capsule in the morning and 3 capsules at night, Disp: 120 capsule, Rfl: 6    methylPREDNISolone (MEDROL) 4 MG dose pack, Take as directed on package instructions., Disp: 1 each, Rfl: 0    Multiple Vitamins-Minerals (PRESERVISION AREDS 2 PO), take one tablet po qd, Disp: , Rfl:     omeprazole (priLOSEC) 40 MG capsule, TAKE 1 CAPSULE BY MOUTH ONCE DAILY BEFORE A MEAL, Disp: 90 capsule, Rfl: 3    Ryaltris 665-25 MCG/ACT suspension, INSTILL 2 SPRAYS IN EACH NOSTRIL TWICE DAILY, Disp: , Rfl:     Allergies: Codeine, Erythromycin, Clindamycin/lincomycin, Sulfa antibiotics, and Penicillins    Physical Exam  Vitals and nursing note reviewed.   Constitutional:       General: She is not in acute distress.     Appearance: Normal appearance. She is not ill-appearing, toxic-appearing or diaphoretic.   HENT:      Head: Normocephalic and atraumatic.      Right Ear: Tympanic membrane, ear canal and external ear normal.      Left Ear: Tympanic membrane, ear canal and external ear normal.      Nose: Nose normal.      Mouth/Throat:      Mouth: Mucous membranes are moist.   Eyes:      Pupils: Pupils are equal, round, and reactive to light.   Cardiovascular:      Rate and Rhythm: Normal rate and regular rhythm.      Heart sounds: Normal heart sounds.   Pulmonary:      Effort: Pulmonary effort is normal.      Breath sounds: Normal breath sounds.   Musculoskeletal:         General: Normal range of  motion.        Feet:    Skin:     General: Skin is warm.   Neurological:      General: No focal deficit present.      Mental Status: She is alert.   Psychiatric:         Mood and Affect: Mood normal.         Behavior: Behavior normal.          Result Review :                   Assessment and Plan    Diagnoses and all orders for this visit:    1. Right foot pain (Primary)  This seems to be a flareup of her previous plantar fasciitis and encouraged her to use ice to the area and continue her Tylenol.  In the meantime we will have her referred out to her podiatrist.  2. Sleep apnea, unspecified type  Will have her follow-up with specialist and with home health  3. Neck pain  Follow with neurology  4. Polyneuropathy    5. Encounter for gynecological examination  Will refer to gynecologist for exam per patient request.      Follow Up   No follow-ups on file.  Patient was given instructions and counseling regarding her condition or for health maintenance advice. Please see specific information pulled into the AVS if appropriate.     MIRIAM Tabares  03/19/2024

## 2024-04-15 RX ORDER — ACETAMINOPHEN 500 MG
500 TABLET ORAL EVERY 6 HOURS PRN
Qty: 60 TABLET | Refills: 1 | Status: SHIPPED | OUTPATIENT
Start: 2024-04-15

## 2024-04-15 NOTE — TELEPHONE ENCOUNTER
PATIENT STATES SHE WENT TO HER PHYSICAL THERAPIST, WHO RECOMMENDED A STEROID MEDICATION AND AN ANTI-INFLAMMATORY. PATIENT IS ALLERGIC TO PENICILLIN AND ALSO CANNOT TAKE IBUPROFEN.  PATIENT REQUESTING A SHORT-TERM RX FOR PREDNISONE. PATIENT ALSO REQUESTING RX  MG TYLENOL.

## 2024-04-17 NOTE — TELEPHONE ENCOUNTER
Left detailed VM with message from provider. Advised pt to call office with any questions or concerns.

## 2024-05-16 ENCOUNTER — OFFICE VISIT (OUTPATIENT)
Dept: SLEEP MEDICINE | Age: 79
End: 2024-05-16
Payer: MEDICARE

## 2024-05-16 VITALS
SYSTOLIC BLOOD PRESSURE: 118 MMHG | HEIGHT: 64 IN | TEMPERATURE: 97.8 F | DIASTOLIC BLOOD PRESSURE: 70 MMHG | BODY MASS INDEX: 25.27 KG/M2 | HEART RATE: 75 BPM | OXYGEN SATURATION: 98 % | WEIGHT: 148 LBS

## 2024-05-16 DIAGNOSIS — G47.33 OSA ON CPAP: Primary | ICD-10-CM

## 2024-05-16 PROBLEM — G62.9 NEUROPATHY: Status: ACTIVE | Noted: 2024-01-23

## 2024-05-16 PROBLEM — Z85.828 HISTORY OF MALIGNANT NEOPLASM OF SKIN: Status: ACTIVE | Noted: 2024-01-23

## 2024-05-16 NOTE — PROGRESS NOTES
Subjective:     Chief Complaint:   Chief Complaint   Patient presents with    Sleeping Problem    Snoring       HPI:    Millie De La Fuente is a 78 y.o. female here for follow-up of obstructive sleep apnea.    I saw her in initial consultation in January 2024.  She describes frequent nocturnal awakenings and overnight hypoxemia based upon a home oximeter.  Her  had not noted snoring but takes his hearing aids out at night and she does not think he would notice even if she did.  She describes excessive daytime somnolence    She underwent an HSAT on 1/30/2024 which was inadequate.  Therefore a PSG was performed on 3/6/2024 which revealed a severe degree of obstructive sleep apnea with an AHI of 30.  Optimal pressure appeared to be around 11 cm H2O on that night.    She was initiated on auto CPAP therapy.  Current settings are 9-18 cm H2O.  Her AHI is 0.9 and her mask fit is good.  Compliance is excellent with usage on 41/41 nights for an average of 7 hours and 52 minutes per night    She feels improved on therapy.  Her , who is also on CPAP, has helped her with compliance and mask selection and fit.  She does not feel as though she is 100% but is clearly improved.    Further details are as follows:    [place] Scale scored as 3/24.    Type of mask: full face mask    Overall, she is benefiting from PAP therapy.    Current medications are:   Current Outpatient Medications:     acetaminophen (TYLENOL) 500 MG tablet, Take 1 tablet by mouth Every 6 (Six) Hours As Needed for Mild Pain., Disp: 60 tablet, Rfl: 1    Calcium Carbonate-Vitamin D (calcium-vitamin D) 500-200 MG-UNIT tablet per tablet, , Disp: , Rfl:     cyanocobalamin (VITAMIN B-12) 1000 MCG tablet, , Disp: , Rfl:     denosumab (PROLIA) 60 MG/ML solution prefilled syringe syringe, Inject  under the skin into the appropriate area as directed 1 (One) Time., Disp: , Rfl:     Denta 5000 Plus 1.1 % cream, See Admin Instructions., Disp: , Rfl:     EPINEPHrine  (EPIPEN) 0.3 MG/0.3ML solution auto-injector injection, INJECT 1 PEN INTRAMUSCULARLY ONCE AS DIRECTED FOR ALLERGIC REACTION, Disp: , Rfl:     gabapentin (NEURONTIN) 100 MG capsule, Take 1 capsule in the morning and 3 capsules at night, Disp: 120 capsule, Rfl: 6    methylPREDNISolone (MEDROL) 4 MG dose pack, Take as directed on package instructions., Disp: 1 each, Rfl: 0    Multiple Vitamins-Minerals (PRESERVISION AREDS 2 PO), take one tablet po qd, Disp: , Rfl:     omeprazole (priLOSEC) 40 MG capsule, TAKE 1 CAPSULE BY MOUTH ONCE DAILY BEFORE A MEAL, Disp: 90 capsule, Rfl: 3    Ryaltris 665-25 MCG/ACT suspension, INSTILL 2 SPRAYS IN EACH NOSTRIL TWICE DAILY, Disp: , Rfl: .    The patient's relevant past medical, surgical, family and social history were reviewed and updated in Epic as appropriate.     ROS:    Review of Systems      Objective:    Physical Exam  Vitals reviewed.   Constitutional:       Appearance: She is well-developed.   HENT:      Head: Normocephalic and atraumatic.      Mouth/Throat:      Mouth: Mucous membranes are moist.      Pharynx: Oropharynx is clear.      Comments: Class III airway  Neck:      Thyroid: No thyromegaly.   Cardiovascular:      Rate and Rhythm: Normal rate and regular rhythm.      Heart sounds: No murmur heard.     No friction rub. No gallop.   Pulmonary:      Effort: Pulmonary effort is normal. No respiratory distress.      Breath sounds: No wheezing or rales.   Musculoskeletal:      Cervical back: Neck supple.   Skin:     General: Skin is warm and dry.   Neurological:      Mental Status: She is alert and oriented to person, place, and time.   Psychiatric:         Behavior: Behavior normal.         Thought Content: Thought content normal.         Data:    Patient's PAP download was personally interpreted by me and has not otherwise been independently reported.    PAP download findings:  Average pressure: 11  Average AHI:  1  Average minutes in large leak per night:  low    Assessment:    Problem List Items Addressed This Visit          Pulmonary Problems    ANMOL on CPAP - Primary       78-year-old female with severe obstructive sleep apnea appears to be well treated on auto CPAP therapy at a range of 9-18 cm H2O.  Her download reveals a normal AHI and good mask fit.  Compliance is excellent.  She is clinically improved.  She does not feel back to 100% but clearly feels much better.    Plan:     No change in auto CPAP settings  Continue current mask  I renewed her supplies for the next year  If over time she feels she is not adequately restored by her CPAP therapy she will let us know and we could consider a titration study and I discussed this with her but at this point she wants to stay the current course and continue on her current auto CPAP settings.      Discussed in detail with the patient.  She will call prior to her follow up visit for any new problems.    Level of Risk Low risk of morbidity from additional diagnostic testing or treatment    Signed by  Aristeo Cassidy MD

## 2024-07-09 ENCOUNTER — OFFICE VISIT (OUTPATIENT)
Dept: FAMILY MEDICINE CLINIC | Facility: CLINIC | Age: 79
End: 2024-07-09
Payer: MEDICARE

## 2024-07-09 VITALS
SYSTOLIC BLOOD PRESSURE: 112 MMHG | OXYGEN SATURATION: 98 % | HEART RATE: 70 BPM | BODY MASS INDEX: 24.75 KG/M2 | WEIGHT: 145 LBS | DIASTOLIC BLOOD PRESSURE: 66 MMHG | HEIGHT: 64 IN

## 2024-07-09 DIAGNOSIS — G62.9 POLYNEUROPATHY: ICD-10-CM

## 2024-07-09 DIAGNOSIS — Z13.29 SCREENING FOR THYROID DISORDER: ICD-10-CM

## 2024-07-09 DIAGNOSIS — M79.7 FIBROMYALGIA: Primary | ICD-10-CM

## 2024-07-09 DIAGNOSIS — K21.9 GASTROESOPHAGEAL REFLUX DISEASE WITHOUT ESOPHAGITIS: ICD-10-CM

## 2024-07-09 DIAGNOSIS — E55.9 VITAMIN D DEFICIENCY: ICD-10-CM

## 2024-07-09 DIAGNOSIS — Z13.21 ENCOUNTER FOR VITAMIN DEFICIENCY SCREENING: ICD-10-CM

## 2024-07-09 DIAGNOSIS — Z13.1 SCREENING FOR DIABETES MELLITUS: ICD-10-CM

## 2024-07-09 DIAGNOSIS — E78.2 MIXED HYPERLIPIDEMIA: ICD-10-CM

## 2024-07-09 PROCEDURE — 99214 OFFICE O/P EST MOD 30 MIN: CPT | Performed by: PHYSICIAN ASSISTANT

## 2024-07-09 PROCEDURE — 1125F AMNT PAIN NOTED PAIN PRSNT: CPT | Performed by: PHYSICIAN ASSISTANT

## 2024-07-09 NOTE — PROGRESS NOTES
".Chief Complaint  Fatigue (Feeling \"super weak\")    Subjective          History of Present Illness  Millie De La Fuente is here today with her  History of Present Illness  The patient is a 78-year-old female here today for fatigue.    The patient has been experiencing weakness for the past 3 months, which has progressively worsened.  Despite a slight improvement in her condition, she continues to experience weakness. She denies frequent shortness of breath, but reports tingling and pain in her legs. She has fibromyalgia, which has exacerbated recently. Despite her condition, she retains her ability to  objects, albeit with exhaustion. She has a history of sleep apnea and uses a CPAP machine, which has not provided relief. She frequently takes afternoon naps. She has continued omeprazole and Allegra, which have been effective in managing her symptoms. She has made dietary changes due to an elevated A1c level. She has a history of thyroid issues during her childhood. She denies experiencing fever.  • Her mother had low thyroid.    Objective   Vital Signs:   /66   Pulse 70   Ht 162.6 cm (64\")   Wt 65.8 kg (145 lb)   SpO2 98%   BMI 24.89 kg/m²     Body mass index is 24.89 kg/m².         Review of Systems      Current Outpatient Medications:   •  acetaminophen (TYLENOL) 500 MG tablet, Take 1 tablet by mouth Every 6 (Six) Hours As Needed for Mild Pain., Disp: 60 tablet, Rfl: 1  •  Calcium Carbonate-Vitamin D (calcium-vitamin D) 500-200 MG-UNIT tablet per tablet, , Disp: , Rfl:   •  cyanocobalamin (VITAMIN B-12) 1000 MCG tablet, , Disp: , Rfl:   •  denosumab (PROLIA) 60 MG/ML solution prefilled syringe syringe, Inject  under the skin into the appropriate area as directed 1 (One) Time., Disp: , Rfl:   •  Denta 5000 Plus 1.1 % cream, See Admin Instructions., Disp: , Rfl:   •  EPINEPHrine (EPIPEN) 0.3 MG/0.3ML solution auto-injector injection, INJECT 1 PEN INTRAMUSCULARLY ONCE AS DIRECTED FOR ALLERGIC REACTION, " Disp: , Rfl:   •  gabapentin (NEURONTIN) 100 MG capsule, Take 1 capsule in the morning and 3 capsules at night, Disp: 120 capsule, Rfl: 6  •  methylPREDNISolone (MEDROL) 4 MG dose pack, Take as directed on package instructions., Disp: 1 each, Rfl: 0  •  Multiple Vitamins-Minerals (PRESERVISION AREDS 2 PO), take one tablet po qd, Disp: , Rfl:   •  Ryaltris 665-25 MCG/ACT suspension, INSTILL 2 SPRAYS IN EACH NOSTRIL TWICE DAILY, Disp: , Rfl:   •  omeprazole (priLOSEC) 40 MG capsule, TAKE 1 CAPSULE BY MOUTH ONCE DAILY BEFORE A MEAL (Patient not taking: Reported on 7/9/2024), Disp: 90 capsule, Rfl: 3    Allergies: Codeine, Erythromycin, Clindamycin/lincomycin, Sulfa antibiotics, and Penicillins    Physical Exam  Vitals and nursing note reviewed.   Constitutional:       General: She is not in acute distress.     Appearance: Normal appearance. She is normal weight. She is not ill-appearing, toxic-appearing or diaphoretic.   HENT:      Head: Normocephalic and atraumatic.      Right Ear: Tympanic membrane, ear canal and external ear normal.      Left Ear: Tympanic membrane, ear canal and external ear normal.      Nose: Nose normal.      Mouth/Throat:      Mouth: Mucous membranes are moist.   Eyes:      Pupils: Pupils are equal, round, and reactive to light.   Cardiovascular:      Rate and Rhythm: Normal rate and regular rhythm.      Heart sounds: Normal heart sounds.   Pulmonary:      Effort: Pulmonary effort is normal.      Breath sounds: Normal breath sounds.   Neurological:      General: No focal deficit present.      Mental Status: She is alert.   Psychiatric:         Mood and Affect: Mood normal.         Behavior: Behavior normal.      Physical Exam      Result Review :          Results               Assessment and Plan    Diagnoses and all orders for this visit:    1. Fibromyalgia (Primary)  -     CBC Auto Differential  -     Comprehensive Metabolic Panel    2. Polyneuropathy  -     CBC Auto Differential  -      Comprehensive Metabolic Panel    3. Mixed hyperlipidemia  -     CBC Auto Differential  -     Comprehensive Metabolic Panel  -     Lipid Panel    4. Gastroesophageal reflux disease without esophagitis  -     CBC Auto Differential  -     Comprehensive Metabolic Panel    5. Screening for thyroid disorder  -     CBC Auto Differential  -     Comprehensive Metabolic Panel  -     TSH  -     T4, free    6. Vitamin D deficiency  -     Vitamin D,25-Hydroxy    7. Encounter for vitamin deficiency screening  -     Vitamin B12    8. Screening for diabetes mellitus  -     Hemoglobin A1c      Assessment & Plan        Follow Up   No follow-ups on file.  Patient was given instructions and counseling regarding her condition or for health maintenance advice. Please see specific information pulled into the AVS if appropriate.     Patient or patient representative verbalized consent for the use of Ambient Listening during the visit with  MIRIAM Tabares for chart documentation. 7/9/2024  16:51 EDT    MIRIAM Tabares  07/09/2024

## 2024-07-10 LAB
25(OH)D3+25(OH)D2 SERPL-MCNC: 78 NG/ML (ref 30–100)
ALBUMIN SERPL-MCNC: 4.6 G/DL (ref 3.8–4.8)
ALP SERPL-CCNC: 42 IU/L (ref 44–121)
ALT SERPL-CCNC: 13 IU/L (ref 0–32)
AST SERPL-CCNC: 20 IU/L (ref 0–40)
BASOPHILS # BLD AUTO: 0.1 X10E3/UL (ref 0–0.2)
BASOPHILS NFR BLD AUTO: 1 %
BILIRUB SERPL-MCNC: 0.4 MG/DL (ref 0–1.2)
BUN SERPL-MCNC: 17 MG/DL (ref 8–27)
BUN/CREAT SERPL: 20 (ref 12–28)
CALCIUM SERPL-MCNC: 9.1 MG/DL (ref 8.7–10.3)
CHLORIDE SERPL-SCNC: 99 MMOL/L (ref 96–106)
CHOLEST SERPL-MCNC: 262 MG/DL (ref 100–199)
CO2 SERPL-SCNC: 23 MMOL/L (ref 20–29)
CREAT SERPL-MCNC: 0.84 MG/DL (ref 0.57–1)
EGFRCR SERPLBLD CKD-EPI 2021: 71 ML/MIN/1.73
EOSINOPHIL # BLD AUTO: 0.1 X10E3/UL (ref 0–0.4)
EOSINOPHIL NFR BLD AUTO: 2 %
ERYTHROCYTE [DISTWIDTH] IN BLOOD BY AUTOMATED COUNT: 12.1 % (ref 11.7–15.4)
GLOBULIN SER CALC-MCNC: 2.2 G/DL (ref 1.5–4.5)
GLUCOSE SERPL-MCNC: 94 MG/DL (ref 70–99)
HBA1C MFR BLD: 5.7 % (ref 4.8–5.6)
HCT VFR BLD AUTO: 38.2 % (ref 34–46.6)
HDLC SERPL-MCNC: 94 MG/DL
HGB BLD-MCNC: 12.1 G/DL (ref 11.1–15.9)
IMM GRANULOCYTES # BLD AUTO: 0 X10E3/UL (ref 0–0.1)
IMM GRANULOCYTES NFR BLD AUTO: 1 %
LDLC SERPL CALC-MCNC: 162 MG/DL (ref 0–99)
LYMPHOCYTES # BLD AUTO: 1.2 X10E3/UL (ref 0.7–3.1)
LYMPHOCYTES NFR BLD AUTO: 23 %
MCH RBC QN AUTO: 30.3 PG (ref 26.6–33)
MCHC RBC AUTO-ENTMCNC: 31.7 G/DL (ref 31.5–35.7)
MCV RBC AUTO: 96 FL (ref 79–97)
MONOCYTES # BLD AUTO: 0.7 X10E3/UL (ref 0.1–0.9)
MONOCYTES NFR BLD AUTO: 13 %
NEUTROPHILS # BLD AUTO: 3.1 X10E3/UL (ref 1.4–7)
NEUTROPHILS NFR BLD AUTO: 60 %
PLATELET # BLD AUTO: 221 X10E3/UL (ref 150–450)
POTASSIUM SERPL-SCNC: 4.9 MMOL/L (ref 3.5–5.2)
PROT SERPL-MCNC: 6.8 G/DL (ref 6–8.5)
RBC # BLD AUTO: 4 X10E6/UL (ref 3.77–5.28)
SODIUM SERPL-SCNC: 135 MMOL/L (ref 134–144)
T4 FREE SERPL-MCNC: 1.25 NG/DL (ref 0.82–1.77)
TRIGL SERPL-MCNC: 43 MG/DL (ref 0–149)
TSH SERPL DL<=0.005 MIU/L-ACNC: 1.84 UIU/ML (ref 0.45–4.5)
VIT B12 SERPL-MCNC: 638 PG/ML (ref 232–1245)
VLDLC SERPL CALC-MCNC: 6 MG/DL (ref 5–40)
WBC # BLD AUTO: 5.2 X10E3/UL (ref 3.4–10.8)

## 2024-07-12 NOTE — PROGRESS NOTES
Please call patient and let her know that her liver and kidney function as well as her electrolytes are normal. Her A1c is 5.7 which has come down a bit from her last test. Her thyroid function is normal, Her vit D and B12 levels are also normal. Her blood count is also normal

## 2024-07-15 DIAGNOSIS — G62.9 POLYNEUROPATHY: ICD-10-CM

## 2024-07-15 RX ORDER — GABAPENTIN 100 MG/1
CAPSULE ORAL
Qty: 120 CAPSULE | Refills: 0 | Status: SHIPPED | OUTPATIENT
Start: 2024-07-15

## 2024-07-15 NOTE — TELEPHONE ENCOUNTER
Rx Refill Note  Requested Prescriptions     Pending Prescriptions Disp Refills    gabapentin (NEURONTIN) 100 MG capsule [Pharmacy Med Name: Gabapentin 100 MG Oral Capsule] 120 capsule 0     Sig: TAKE 1 CAPSULE BY MOUTH IN THE MORNING AND 3 IN THE EVENING      Last filled:01/15/2024  Last office visit with prescribing clinician: 1/15/2024      Next office visit with prescribing clinician: Visit date not found     Rubina Bains MA  07/15/24, 13:17 EDT

## 2024-11-07 ENCOUNTER — OFFICE VISIT (OUTPATIENT)
Dept: FAMILY MEDICINE CLINIC | Facility: CLINIC | Age: 79
End: 2024-11-07
Payer: MEDICARE

## 2024-11-07 VITALS
DIASTOLIC BLOOD PRESSURE: 66 MMHG | SYSTOLIC BLOOD PRESSURE: 118 MMHG | BODY MASS INDEX: 24.24 KG/M2 | OXYGEN SATURATION: 99 % | HEIGHT: 64 IN | WEIGHT: 142 LBS | HEART RATE: 63 BPM

## 2024-11-07 DIAGNOSIS — K59.09 OTHER CONSTIPATION: Primary | ICD-10-CM

## 2024-11-07 DIAGNOSIS — R35.0 FREQUENT URINATION: ICD-10-CM

## 2024-11-07 PROBLEM — E67.3 HYPERVITAMINOSIS D: Status: ACTIVE | Noted: 2024-09-09

## 2024-11-07 LAB
BILIRUB BLD-MCNC: NEGATIVE MG/DL
CLARITY, POC: CLEAR
COLOR UR: YELLOW
EXPIRATION DATE: NORMAL
GLUCOSE UR STRIP-MCNC: NEGATIVE MG/DL
KETONES UR QL: NEGATIVE
LEUKOCYTE EST, POC: NEGATIVE
Lab: NORMAL
NITRITE UR-MCNC: NEGATIVE MG/ML
PH UR: 5.5 [PH] (ref 5–8)
PROT UR STRIP-MCNC: NEGATIVE MG/DL
RBC # UR STRIP: NEGATIVE /UL
SP GR UR: 1 (ref 1–1.03)
UROBILINOGEN UR QL: NORMAL

## 2024-11-07 PROCEDURE — 99213 OFFICE O/P EST LOW 20 MIN: CPT | Performed by: PHYSICIAN ASSISTANT

## 2024-11-07 PROCEDURE — 1125F AMNT PAIN NOTED PAIN PRSNT: CPT | Performed by: PHYSICIAN ASSISTANT

## 2024-11-07 PROCEDURE — 81003 URINALYSIS AUTO W/O SCOPE: CPT | Performed by: PHYSICIAN ASSISTANT

## 2024-11-07 NOTE — PROGRESS NOTES
".Chief Complaint  Urinary Tract Infection (Frequent urination, just not feeling well)    Subjective          History of Present Illness  Millie De La Fuente is here today with her UTI symptoms  History of Present Illness  The patient presents for evaluation of urinary issues.    She reports experiencing pressure on her bladder, which has been gradually worsening over the past few weeks. She also mentions difficulty in initiating urination, often needing to exert effort. Despite these issues, she does not experience any pain during urination. She has noticed an increased frequency of urination, which is a new symptom for her. She admits to not drinking enough water and has been feeling unwell recently. Her bowel movements are frequent but incomplete.    She has made dietary changes, including reducing sugar, bread, and processed foods, and increasing her intake of fruits and vegetables.    Additionally, she has some sinus and allergy issues.    Objective   Vital Signs:   /66   Pulse 63   Ht 162.6 cm (64\")   Wt 64.4 kg (142 lb)   SpO2 99%   BMI 24.37 kg/m²     Body mass index is 24.37 kg/m².  BMI is within normal parameters. No other follow-up for BMI required.      Review of Systems      Current Outpatient Medications:     Multiple Vitamins-Minerals (PRESERVISION AREDS 2 PO), take one tablet po qd, Disp: , Rfl:     acetaminophen (TYLENOL) 500 MG tablet, Take 1 tablet by mouth Every 6 (Six) Hours As Needed for Mild Pain. (Patient not taking: Reported on 11/7/2024), Disp: 60 tablet, Rfl: 1    Calcium Carbonate-Vitamin D (calcium-vitamin D) 500-200 MG-UNIT tablet per tablet, , Disp: , Rfl:     cyanocobalamin (VITAMIN B-12) 1000 MCG tablet, , Disp: , Rfl:     denosumab (PROLIA) 60 MG/ML solution prefilled syringe syringe, Inject  under the skin into the appropriate area as directed 1 (One) Time. (Patient not taking: Reported on 11/7/2024), Disp: , Rfl:     Denta 5000 Plus 1.1 % cream, See Admin Instructions. (Patient " not taking: Reported on 11/7/2024), Disp: , Rfl:     EPINEPHrine (EPIPEN) 0.3 MG/0.3ML solution auto-injector injection, INJECT 1 PEN INTRAMUSCULARLY ONCE AS DIRECTED FOR ALLERGIC REACTION (Patient not taking: Reported on 11/7/2024), Disp: , Rfl:     gabapentin (NEURONTIN) 100 MG capsule, TAKE 1 CAPSULE BY MOUTH IN THE MORNING AND 3 IN THE EVENING (Patient not taking: Reported on 11/7/2024), Disp: 120 capsule, Rfl: 0    methylPREDNISolone (MEDROL) 4 MG dose pack, Take as directed on package instructions. (Patient not taking: Reported on 11/7/2024), Disp: 1 each, Rfl: 0    omeprazole (priLOSEC) 40 MG capsule, TAKE 1 CAPSULE BY MOUTH ONCE DAILY BEFORE A MEAL (Patient not taking: Reported on 11/7/2024), Disp: 90 capsule, Rfl: 3    Ryaltris 665-25 MCG/ACT suspension, INSTILL 2 SPRAYS IN EACH NOSTRIL TWICE DAILY (Patient not taking: Reported on 11/7/2024), Disp: , Rfl:     Allergies: Codeine, Erythromycin, Clindamycin/lincomycin, Sulfa antibiotics, and Penicillins    Physical Exam  Vitals and nursing note reviewed.   Constitutional:       General: She is not in acute distress.     Appearance: Normal appearance. She is normal weight. She is not ill-appearing, toxic-appearing or diaphoretic.   HENT:      Head: Normocephalic and atraumatic.   Cardiovascular:      Rate and Rhythm: Normal rate and regular rhythm.      Heart sounds: Normal heart sounds.   Pulmonary:      Effort: Pulmonary effort is normal.      Breath sounds: Normal breath sounds.   Abdominal:      Palpations: Abdomen is soft.       Neurological:      General: No focal deficit present.      Mental Status: She is alert.   Psychiatric:         Mood and Affect: Mood normal.         Behavior: Behavior normal.      Physical Exam      Result Review :          Results  Laboratory Studies  Urine test shows no abnormalities.             Assessment and Plan    Diagnoses and all orders for this visit:    1. Other constipation (Primary)  She reports partial bowel  movements and a sensation of incomplete rectal emptying, likely due to recent dietary changes. MiraLAX was recommended to be taken daily for a week to help improve bowel movements. She was advised to mix MiraLAX with water, juice, or Gatorade, but not with milk or carbonated beverages.  2. Frequent urination  -     POC Urinalysis Dipstick, Automated  -     Urine Culture - Urine, Urine, Clean Catch  Her urine analysis results are normal. The sensation of constant pressure and fullness could be attributed to incomplete rectal emptying, which is exerting pressure on her bladder.. MiraLAX was recommended for daily use over a week to facilitate bowel movement and alleviate the pressure on her bladder.    If her symptoms worsen or change, she is to return to clinic  Assessment & Plan          Follow Up   No follow-ups on file.  Patient was given instructions and counseling regarding her condition or for health maintenance advice. Please see specific information pulled into the AVS if appropriate.     Patient or patient representative verbalized consent for the use of Ambient Listening during the visit with  MIRIAM Tabares for chart documentation. 11/8/2024  09:49 EST    MIRIAM Tabares  11/07/2024

## 2024-11-09 LAB
BACTERIA UR CULT: NO GROWTH
BACTERIA UR CULT: NORMAL

## 2024-11-11 NOTE — PROGRESS NOTES
Please call the patient and let her know that her urine culture showed no growth/no infection- how has she been feeling?

## 2024-12-20 ENCOUNTER — OFFICE VISIT (OUTPATIENT)
Dept: FAMILY MEDICINE CLINIC | Facility: CLINIC | Age: 79
End: 2024-12-20
Payer: MEDICARE

## 2024-12-20 ENCOUNTER — TRANSCRIBE ORDERS (OUTPATIENT)
Dept: CT IMAGING | Facility: CLINIC | Age: 79
End: 2024-12-20
Payer: MEDICARE

## 2024-12-20 VITALS
HEART RATE: 68 BPM | DIASTOLIC BLOOD PRESSURE: 62 MMHG | BODY MASS INDEX: 24.24 KG/M2 | SYSTOLIC BLOOD PRESSURE: 118 MMHG | WEIGHT: 142 LBS | HEIGHT: 64 IN | OXYGEN SATURATION: 95 %

## 2024-12-20 DIAGNOSIS — J06.9 ACUTE URI: Primary | ICD-10-CM

## 2024-12-20 DIAGNOSIS — J02.9 SORE THROAT: ICD-10-CM

## 2024-12-20 DIAGNOSIS — Z12.31 ENCOUNTER FOR SCREENING MAMMOGRAM FOR MALIGNANT NEOPLASM OF BREAST: Primary | ICD-10-CM

## 2024-12-20 LAB
EXPIRATION DATE: NORMAL
FLUAV AG NPH QL: NEGATIVE
FLUBV AG NPH QL: NEGATIVE
INTERNAL CONTROL: NORMAL
Lab: NORMAL
S PYO AG THROAT QL: NEGATIVE
SARS-COV-2 AG UPPER RESP QL IA.RAPID: NOT DETECTED

## 2024-12-20 RX ORDER — DOXYCYCLINE 100 MG/1
100 CAPSULE ORAL 2 TIMES DAILY
Qty: 20 CAPSULE | Refills: 0 | Status: SHIPPED | OUTPATIENT
Start: 2024-12-20

## 2024-12-20 RX ORDER — DEXTROMETHORPHAN HYDROBROMIDE AND PROMETHAZINE HYDROCHLORIDE 15; 6.25 MG/5ML; MG/5ML
5 SYRUP ORAL 4 TIMES DAILY PRN
Qty: 120 ML | Refills: 0 | Status: SHIPPED | OUTPATIENT
Start: 2024-12-20

## 2024-12-20 NOTE — PROGRESS NOTES
"Chief Complaint  Sore Throat (X3 days/)    Subjective          Millie De La Fuente presents to [facility]  History of Present Illness  Pt has had cough, congestion, and sore throat since yesterday. She has had chills, fatigue, and body aches.   Sore Throat   Associated symptoms include congestion and coughing. Pertinent negatives include no shortness of breath.       History of Present Illness      Objective   Vital Signs:   /62   Pulse 68   Ht 162.6 cm (64\")   Wt 64.4 kg (142 lb)   SpO2 95%   BMI 24.37 kg/m²     Body mass index is 24.37 kg/m².    Review of Systems   Constitutional:  Positive for chills and fatigue. Negative for fever.   HENT:  Positive for congestion and sore throat.    Respiratory:  Positive for cough. Negative for shortness of breath.    Cardiovascular:  Negative for chest pain, palpitations and leg swelling.   Neurological:  Negative for syncope.   Psychiatric/Behavioral:  The patient is not nervous/anxious.           Current Outpatient Medications:     Multiple Vitamins-Minerals (PRESERVISION AREDS 2 PO), take one tablet po qd, Disp: , Rfl:     Ryaltris 665-25 MCG/ACT suspension, , Disp: , Rfl:     doxycycline (VIBRAMYCIN) 100 MG capsule, Take 1 capsule by mouth 2 (Two) Times a Day., Disp: 20 capsule, Rfl: 0    promethazine-dextromethorphan (PROMETHAZINE-DM) 6.25-15 MG/5ML syrup, Take 5 mL by mouth 4 (Four) Times a Day As Needed for Cough., Disp: 120 mL, Rfl: 0      Allergies: Codeine, Erythromycin, Clindamycin/lincomycin, Sulfa antibiotics, and Penicillins    Physical Exam  Constitutional:       Appearance: Normal appearance.   HENT:      Head: Normocephalic.   Eyes:      Conjunctiva/sclera: Conjunctivae normal.      Pupils: Pupils are equal, round, and reactive to light.   Cardiovascular:      Rate and Rhythm: Normal rate and regular rhythm.      Heart sounds: Normal heart sounds.   Pulmonary:      Effort: Pulmonary effort is normal.      Breath sounds: " Normal breath sounds.      Comments: Faint exp wheezing  Abdominal:      Tenderness: There is no abdominal tenderness.   Musculoskeletal:         General: Normal range of motion.   Skin:     General: Skin is warm and dry.      Capillary Refill: Capillary refill takes less than 2 seconds.   Neurological:      General: No focal deficit present.      Mental Status: She is alert and oriented to person, place, and time.   Psychiatric:         Mood and Affect: Mood normal.         Behavior: Behavior normal.         Thought Content: Thought content normal.         Judgment: Judgment normal.          Physical Exam         Result Review :                Results            Assessment and Plan    Diagnoses and all orders for this visit:    1. Acute URI (Primary)  Comments:  Increase fluids. Mucinex, Phen DM, and Benadryl PRN. Pt is leaving for [place] so will take Doxy with her and fill if not improving in 4 days.  Orders:  -     doxycycline (VIBRAMYCIN) 100 MG capsule; Take 1 capsule by mouth 2 (Two) Times a Day.  Dispense: 20 capsule; Refill: 0  -     promethazine-dextromethorphan (PROMETHAZINE-DM) 6.25-15 MG/5ML syrup; Take 5 mL by mouth 4 (Four) Times a Day As Needed for Cough.  Dispense: 120 mL; Refill: 0    2. Sore throat  -     POC Rapid Strep A  -     POC Influenza A / B  -     POCT STEPHANIA SARS-CoV-2 Antigen RAFAEL                  Follow Up   Return in about 1 week (around 12/27/2024) for if not improving or sooner if symptoms worsen.  Patient was given instructions and counseling regarding her condition or for health maintenance advice. Please see specific information pulled into the AVS if appropriate.     OLEG Marino

## 2025-04-28 ENCOUNTER — OFFICE VISIT (OUTPATIENT)
Dept: SLEEP MEDICINE | Age: 80
End: 2025-04-28
Payer: MEDICARE

## 2025-04-28 VITALS
BODY MASS INDEX: 23.9 KG/M2 | WEIGHT: 140 LBS | DIASTOLIC BLOOD PRESSURE: 58 MMHG | HEIGHT: 64 IN | TEMPERATURE: 98.7 F | OXYGEN SATURATION: 97 % | SYSTOLIC BLOOD PRESSURE: 120 MMHG | HEART RATE: 69 BPM

## 2025-04-28 DIAGNOSIS — G47.33 OSA ON CPAP: Primary | ICD-10-CM

## 2025-04-28 RX ORDER — SODIUM FLUORIDE 1.1 G/100G
CREAM ORAL SEE ADMIN INSTRUCTIONS
COMMUNITY
Start: 2025-03-25

## 2025-04-28 NOTE — PROGRESS NOTES
Subjective:     Chief Complaint:   Chief Complaint   Patient presents with    Sleep Apnea    Dry Mouth       HPI:    Millie De La Fuente is a 79 y.o. female here for follow-up of obstructive sleep apnea.    I saw her in initial consultation in January 2024.  She described frequent nocturnal awakenings and overnight hypoxemia based upon a home oximeter.  Her  had not noted snoring but takes his hearing aids out at night and she did not think he would notice even if she did.  She described excessive daytime somnolence    She underwent an HSAT on 1/30/2024 which was inadequate.  Therefore a PSG was performed on 3/6/2024 which revealed a severe degree of obstructive sleep apnea with an AHI of 30.  Optimal pressure appeared to be around 11 cm H2O on that night.    She was initiated on auto CPAP therapy.  Current settings are 9-18 cm H2O.  She is here for a routine follow-up.  She benefits from therapy.  Compliance is good at over 8 hours per night and she has used it on 30/30 nights over the last month.  AHI is 1.4 with median pressure of 12 cm H2O and mask fit is intermittently high    She has noted increased usage of water and some increased awakenings at night.    Further details are as follows:    Camden Scale scored as 8/24.    Type of mask: full face mask    Overall, she is benefiting from PAP therapy.    Current medications are:   Current Outpatient Medications:     Denta 5000 Plus 1.1 % cream, See Admin Instructions., Disp: , Rfl:     doxycycline (VIBRAMYCIN) 100 MG capsule, Take 1 capsule by mouth 2 (Two) Times a Day., Disp: 20 capsule, Rfl: 0    Multiple Vitamins-Minerals (PRESERVISION AREDS 2 PO), take one tablet po qd, Disp: , Rfl:     Ryaltris 665-25 MCG/ACT suspension, , Disp: , Rfl:     promethazine-dextromethorphan (PROMETHAZINE-DM) 6.25-15 MG/5ML syrup, Take 5 mL by mouth 4 (Four) Times a Day As Needed for Cough. (Patient not taking: Reported on 4/28/2025), Disp: 120 mL, Rfl: 0.    The patient's  relevant past medical, surgical, family and social history were reviewed and updated in Epic as appropriate.     ROS:    Review of Systems      Objective:    Physical Exam  Vitals reviewed.   Constitutional:       Appearance: She is well-developed.   HENT:      Head: Normocephalic and atraumatic.      Mouth/Throat:      Mouth: Mucous membranes are moist.      Pharynx: Oropharynx is clear.      Comments: Class III airway  Neck:      Thyroid: No thyromegaly.   Pulmonary:      Effort: Pulmonary effort is normal. No respiratory distress.   Skin:     General: Skin is warm and dry.   Neurological:      Mental Status: She is alert and oriented to person, place, and time.   Psychiatric:         Behavior: Behavior normal.         Thought Content: Thought content normal.         Data:    Patient's PAP download was personally interpreted by me and has not otherwise been independently reported.    PAP download findings:  Average pressure: 12  Average AHI:  1.4  Average minutes in large leak per night: intermittently high    Assessment:    Problem List Items Addressed This Visit          Pulmonary Problems    ANMOL on CPAP - Primary       78-year-old female with severe obstructive sleep apnea appears to be generally well treated on auto CPAP therapy at a range of 9-18 cm H2O.  She has noted recently an increase in nocturnal awakenings and increased water usage without changing her humidifier settings.  I would be concerned about a mask leak and this has been present intermittently on her download.    Plan:     No change in auto CPAP settings  I asked her to check her mask and straps for any deterioration and replace as needed  I renewed her supplies for the next year      Discussed in detail with the patient.  She will call prior to her follow up visit for any new problems.    Level of Risk Low risk of morbidity from additional diagnostic testing or treatment    Signed by  Aristeo Cassidy MD

## 2025-07-21 ENCOUNTER — TELEPHONE (OUTPATIENT)
Dept: FAMILY MEDICINE CLINIC | Facility: CLINIC | Age: 80
End: 2025-07-21
Payer: MEDICARE

## 2025-07-21 NOTE — TELEPHONE ENCOUNTER
Caller: Millie De La Fuente    Relationship: Self    Best call back number: [phone]    What orders are you requesting (i.e. lab or imaging): A1-C    In what timeframe would the patient need to come in: ASAP    Where will you receive your lab/imaging services: [facility]    Additional notes: PLEASE CALL PATIENT TO SCHEDULE WHEN LAB IS ACTIVE

## 2025-07-21 NOTE — TELEPHONE ENCOUNTER
Lets call her and schedule an annual exam for her- can get labs at that time- looks like it has been over a year since her last set of labs

## 2025-07-21 NOTE — TELEPHONE ENCOUNTER
Pt has no upcoming appt scheduled and is past due an annual. Would you like for her to schedule first then to do labs day of appt?

## 2025-07-31 ENCOUNTER — OFFICE VISIT (OUTPATIENT)
Dept: FAMILY MEDICINE CLINIC | Facility: CLINIC | Age: 80
End: 2025-07-31
Payer: MEDICARE

## 2025-07-31 VITALS
HEIGHT: 64 IN | OXYGEN SATURATION: 96 % | WEIGHT: 139 LBS | BODY MASS INDEX: 23.73 KG/M2 | SYSTOLIC BLOOD PRESSURE: 116 MMHG | HEART RATE: 67 BPM | DIASTOLIC BLOOD PRESSURE: 62 MMHG

## 2025-07-31 DIAGNOSIS — R73.03 PREDIABETES: ICD-10-CM

## 2025-07-31 DIAGNOSIS — E78.2 MIXED HYPERLIPIDEMIA: Primary | ICD-10-CM

## 2025-07-31 NOTE — PROGRESS NOTES
Subjective   The ABCs of the Annual Wellness Visit  Medicare Wellness Visit      Millie De La Fuente is a 80 y.o. patient who presents for a Medicare Wellness Visit.    The following portions of the patient's history were reviewed and   updated as appropriate: allergies, current medications, and problem list.    Compared to one year ago, the patient's physical   health is better.  Compared to one year ago, the patient's mental   health is better.    Recent Hospitalizations:  She was not admitted to the hospital during the last year.     Current Medical Providers:  Patient Care Team:  Chasity Madera PA as PCP - General (Family Medicine)  Remi Morris MD as Consulting Physician (Family Medicine)  Aristeo Cassidy MD as Consulting Physician (Pulmonary Disease)    Outpatient Medications Prior to Visit   Medication Sig Dispense Refill    Multiple Vitamins-Minerals (PRESERVISION AREDS 2 PO) take one tablet po qd      Denta 5000 Plus 1.1 % cream See Admin Instructions. (Patient not taking: Reported on 7/31/2025)      doxycycline (VIBRAMYCIN) 100 MG capsule Take 1 capsule by mouth 2 (Two) Times a Day. (Patient not taking: Reported on 7/31/2025) 20 capsule 0    promethazine-dextromethorphan (PROMETHAZINE-DM) 6.25-15 MG/5ML syrup Take 5 mL by mouth 4 (Four) Times a Day As Needed for Cough. (Patient not taking: Reported on 7/31/2025) 120 mL 0    Ryaltris 665-25 MCG/ACT suspension  (Patient not taking: Reported on 7/31/2025)       No facility-administered medications prior to visit.     No opioid medication identified on active medication list. I have reviewed chart for other potential  high risk medication/s and harmful drug interactions in the elderly.      Aspirin is not on active medication list.  Aspirin use is not indicated based on review of current medical condition/s. Risk of harm outweighs potential benefits.  .    Patient Active Problem List   Diagnosis    Fibromyalgia    Allergic rhinitis due to pollen     "Gastroesophageal reflux disease without esophagitis    High risk medication use    Macular degeneration    Mixed hyperlipidemia    Polyneuropathy    Primary osteoarthritis of both hips    Senile osteoporosis    Vitamin D deficiency    Acute non-recurrent maxillary sinusitis    Osteoporosis    Seasonal affective disorder    ANMOL on CPAP    History of malignant neoplasm of skin    Hypervitaminosis D     Advance Care Planning Advance Directive is not on file.  ACP discussion was held with the patient during this visit. Patient has an advance directive (not in EMR), copy requested.            Objective   Vitals:    25 1006   BP: 116/62   Pulse: 67   SpO2: 96%   Weight: 63 kg (139 lb)   Height: 162.6 cm (64\")   PainSc: 3    PainLoc: Foot       Estimated body mass index is 23.86 kg/m² as calculated from the following:    Height as of this encounter: 162.6 cm (64\").    Weight as of this encounter: 63 kg (139 lb).    BMI is within normal parameters. No other follow-up for BMI required.           Does the patient have evidence of cognitive impairment? No                                                                                                Health  Risk Assessment    Smoking Status:  Social History     Tobacco Use   Smoking Status Former    Current packs/day: 0.00    Average packs/day: 1 pack/day for 27.0 years (27.0 ttl pk-yrs)    Types: Cigarettes    Start date: 1965    Quit date: 1992    Years since quittin.6    Passive exposure: Past   Smokeless Tobacco Never   Tobacco Comments    I quit several times and was a closet smoker for the last 10 years     Alcohol Consumption:  Social History     Substance and Sexual Activity   Alcohol Use Not Currently    Comment: stopped drinking in        Fall Risk Screen  STEADI Fall Risk Assessment was completed, and patient is at LOW risk for falls.Assessment completed on:2025    Depression Screening   Little interest or pleasure in doing things? Not at " all   Feeling down, depressed, or hopeless? Not at all   PHQ-2 Total Score 0      Health Habits and Functional and Cognitive Screenin/31/2025     9:25 AM   Functional & Cognitive Status   Do you have difficulty preparing food and eating? No   Do you have difficulty bathing yourself, getting dressed or grooming yourself? No   Do you have difficulty using the toilet? No   Do you have difficulty moving around from place to place? No   Do you have trouble with steps or getting out of a bed or a chair? No   Current Diet Well Balanced Diet   Dental Exam Unknown   Eye Exam Up to date   Exercise (times per week) 3 times per week   Current Exercises Include Hiking;Light Weights;Walking   Do you need help using the phone?  No   Are you deaf or do you have serious difficulty hearing?  No   Do you need help to go to places out of walking distance? No   Do you need help shopping? No   Do you need help preparing meals?  No   Do you need help with housework?  No   Do you need help with laundry? No   Do you need help taking your medications? No   Do you need help managing money? No   Do you ever drive or ride in a car without wearing a seat belt? No   Have you felt unusual fatigue (could be tiredness), stress, anger or loneliness in the last month? No   Who do you live with? Spouse   If you need help, do you have trouble finding someone available to you? No   Have you been bothered in the last four weeks by sexual problems? No   Do you have difficulty concentrating, remembering or making decisions? No           Age-appropriate Screening Schedule:  Refer to the list below for future screening recommendations based on patient's age, sex and/or medical conditions. Orders for these recommended tests are listed in the plan section. The patient has been provided with a written plan.    Health Maintenance List  Health Maintenance   Topic Date Due    ZOSTER VACCINE (1 of 2) Never done    RSV Vaccine - Adults (1 - 1-dose 75+  series) Never done    TDAP/TD VACCINES (2 - Tdap) 05/13/2024    LIPID PANEL  07/09/2025    COVID-19 Vaccine (2 - 2024-25 season) 01/31/2026 (Originally 9/1/2024)    INFLUENZA VACCINE  10/01/2025    ANNUAL WELLNESS VISIT  07/31/2026    DXA SCAN  02/03/2027    Pneumococcal Vaccine 50+  Completed                                                                                                                                                CMS Preventative Services Quick Reference  Risk Factors Identified During Encounter  None Identified    The above risks/problems have been discussed with the patient.  Pertinent information has been shared with the patient in the After Visit Summary.  An After Visit Summary and PPPS were made available to the patient.    Follow Up:   Next Medicare Wellness visit to be scheduled in 1 year.

## 2025-08-01 ENCOUNTER — RESULTS FOLLOW-UP (OUTPATIENT)
Dept: FAMILY MEDICINE CLINIC | Facility: CLINIC | Age: 80
End: 2025-08-01
Payer: MEDICARE

## 2025-08-01 LAB
CHOLEST SERPL-MCNC: 253 MG/DL (ref 100–199)
HBA1C MFR BLD: 5.5 % (ref 4.8–5.6)
HDLC SERPL-MCNC: 97 MG/DL
LDLC SERPL CALC-MCNC: 150 MG/DL (ref 0–99)
TRIGL SERPL-MCNC: 40 MG/DL (ref 0–149)
VLDLC SERPL CALC-MCNC: 6 MG/DL (ref 5–40)

## 2025-08-01 NOTE — LETTER
Millie De La Fuente  [address] [id number]    August 1, 2025     Dear Ms. De La Fuente:    Below are the results from your recent visit:    Resulted Orders   Lipid Panel   Result Value Ref Range    Total Cholesterol 253 (H) 100 - 199 mg/dL    Triglycerides 40 0 - 149 mg/dL    HDL Cholesterol 97 >39 mg/dL    VLDL Cholesterol Dmitry 6 5 - 40 mg/dL    LDL Chol Calc (NIH) 150 (H) 0 - 99 mg/dL   Hemoglobin A1c   Result Value Ref Range    Hemoglobin A1C 5.5 4.8 - 5.6 %      Comment:               Prediabetes: 5.7 - 6.4           Diabetes: >6.4           Glycemic control for adults with diabetes: <7.0         Please call the patient and let her know that her total cholesterol remains high at 253 and her ldl is also high at 150. Would encourage her to cut teofilo on fired foods and foods high in cholesterol. Her A1c is normal at 5.5     If you have any questions or concerns, please don't hesitate to call.         Sincerely,        MIRIAM Tabares